# Patient Record
Sex: MALE | Race: BLACK OR AFRICAN AMERICAN | Employment: UNEMPLOYED | URBAN - METROPOLITAN AREA
[De-identification: names, ages, dates, MRNs, and addresses within clinical notes are randomized per-mention and may not be internally consistent; named-entity substitution may affect disease eponyms.]

---

## 2020-12-30 ENCOUNTER — OFFICE VISIT (OUTPATIENT)
Dept: INTERNAL MEDICINE CLINIC | Age: 50
End: 2020-12-30
Payer: MEDICAID

## 2020-12-30 VITALS
BODY MASS INDEX: 31.89 KG/M2 | SYSTOLIC BLOOD PRESSURE: 133 MMHG | HEIGHT: 67 IN | DIASTOLIC BLOOD PRESSURE: 82 MMHG | HEART RATE: 63 BPM | RESPIRATION RATE: 18 BRPM | TEMPERATURE: 98.5 F | WEIGHT: 203.2 LBS | OXYGEN SATURATION: 97 %

## 2020-12-30 DIAGNOSIS — E66.9 OBESITY (BMI 30.0-34.9): ICD-10-CM

## 2020-12-30 DIAGNOSIS — Z76.89 ENCOUNTER TO ESTABLISH CARE: Primary | ICD-10-CM

## 2020-12-30 DIAGNOSIS — Z12.11 SCREEN FOR COLON CANCER: ICD-10-CM

## 2020-12-30 PROCEDURE — 36415 COLL VENOUS BLD VENIPUNCTURE: CPT | Performed by: INTERNAL MEDICINE

## 2020-12-30 PROCEDURE — 99203 OFFICE O/P NEW LOW 30 MIN: CPT | Performed by: INTERNAL MEDICINE

## 2020-12-30 PROCEDURE — 93000 ELECTROCARDIOGRAM COMPLETE: CPT | Performed by: INTERNAL MEDICINE

## 2020-12-30 NOTE — PROGRESS NOTES
Chief Complaint   Patient presents with    New Patient     establish care, family history of hypertension           1. Have you been to the ER, urgent care clinic since your last visit? Hospitalized since your last visit? No    2. Have you seen or consulted any other health care providers outside of the [address] [place] [address] since your last visit? Include any pap smears or colon screening.  No

## 2020-12-30 NOTE — PROGRESS NOTES
[facility]  Lavelle Hernandez MD, [address] [address]  Phone:  [phone]  Fax: [phone]    Chief Complaint   Patient presents with    New Patient     establish care, family history of hypertension       SUBJECTIVE:    Melody Sullivan is a 45 y.o. male Patient returns today and is seen as a new patient for evaluation and ongoing care. He is concerned that he may have \"low T\". He is functioning appropriately. He feels sluggish sometimes. Patient is seen for evaluation. He is also wondering about having a heart scan offered by [employer] for $99.            Past Medical History:   Diagnosis Date    Encounter to establish care     Obesity (BMI 30.0-34. 9)      History reviewed. No pertinent surgical history.   No Known Allergies    REVIEW OF SYSTEMS:  General: negative for - chills or fever  ENT: negative for - headaches, nasal congestion or tinnitus  Respiratory: negative for - cough, hemoptysis, shortness of breath or wheezing  Cardiovascular : negative for - chest pain, edema, palpitations or shortness of breath  Gastrointestinal: negative for - abdominal pain, blood in stools, heartburn or nausea/vomiting  Genito-Urinary: no dysuria, trouble voiding, or hematuria  Musculoskeletal: negative for - gait disturbance, joint pain, joint stiffness or joint swelling  Neurological: no TIA or stroke symptoms  Hematologic: no bruises, no bleeding, no swollen glands  Integument: no lumps, mole changes, nail changes or rash  Endocrine:no malaise/lethargy or unexpected weight changes      Social History     Socioeconomic History    Marital status: SINGLE     Spouse name: Not on file    Number of children: Not on file    Years of education: Not on file    Highest education level: Not on file   Tobacco Use    Smoking status: Never Smoker    Smokeless tobacco: Never Used   Substance and Sexual Activity    Alcohol use: Not Currently    Drug use: Never    Sexual activity: Yes     Partners: Female     Birth control/protection: Condom     Family History   Problem Relation Age of Onset    Hypertension Mother     Diabetes Mother    Habits:  He is a lifetime non drug abuser, non cigarette abuser, occasional alcohol use. Social History:  The patient is single, lives with his girlfriend. He has a 27year old son, no grandchildren. He completed tenth grade, plus GED. He is gainfully employed as a [employer] , local primarily. No [employer] preference. Family History:  Father , perhaps related to [employer], he is not for sure. Mother 71 with diabetes and hypertension. One brother with a history of hypertension. OBJECTIVE:     Visit Vitals  /82   Pulse 63   Temp 98.5 °F (36.9 °C) (Oral)   Resp 18   Ht 5' 7\" (1.702 m)   Wt 203 lb 3.2 oz (92.2 kg)   SpO2 97%   BMI 31.83 kg/m²     CONSTITUTIONAL: well , well nourished, appears age appropriate  EYES: perrla, eom intact  ENMT:moist mucous membranes, pharynx clear  NECK: supple. Thyroid normal  RESPIRATORY: Chest: clear bilaterally  CARDIOVASCULAR: Heart: regular rate and rhythm  GASTROINTESTINAL: Abdomen: soft, bowel sounds active  HEMATOLOGIC: no pathological lymph nodes palpated  MUSCULOSKELETAL: Extremities: no edema, pulse 1+   INTEGUMENT: No unusual rashes or suspicious skin lesions noted. Nails appear normal.  NEUROLOGIC: non-focal exam   MENTAL STATUS: alert and oriented, appropriate affect     No visits with results within 3 Month(s) from this visit. Latest known visit with results is:   No results found for any previous visit. ASSESSMENT:   1. Encounter to establish care    2. Obesity (BMI 30.0-34. 9)      Patient is a healthy male. Blood pressure control is adequate. The only issue is related to his weight and he is just in to the obesity category. He wants to get down to a weight that would take him out of that category and we agree with that.   To that end, we encourage exercise for 30 minutes five days a week and a heart healthy, diabetic diet. Getting back to his weights would be helpful for him. He is concerned about testosterone level. We will do the level, I am not sure his insurance will pay for it, but we will make the request.  We also give copies of the wellness visit and we advise him also that the insurance may or may not pay for it. We suggest a colonoscopy, which he agrees. We suggest he come back and see us once a year. We give him our contact information, as well as MyChart, suggest he sign up for it and can send an email if he has a question. He can call us if he has a question. If he has a true emergency, we recommend [facility] emergency room. I have discussed the diagnosis with the patient and the intended plan as seen in the  orders above. The patient understands and agees with the plan. The patient has   received an after visit summary and questions were answered concerning  future plans  Patient labs and/or xrays were reviewed  Past records were reviewed. PLAN:  .  Orders Placed This Encounter    URINALYSIS W/ RFLX MICROSCOPIC    CBC WITH AUTOMATED DIFF    METABOLIC PANEL, COMPREHENSIVE    LIPID PANEL    PROSTATE SPECIFIC AG    HEMOGLOBIN A1C WITH EAG    TESTOSTERONE, FREE & TOTAL    AMB POC EKG ROUTINE W/ 12 LEADS, INTER & REP           ATTENTION:   This medical record was transcribed using an electronic medical records system. Although proofread, it may and can contain electronic and spelling errors. Other human spelling and other errors may be present. Corrections may be executed at a later time. Please feel free to contact us for any clarifications as needed.

## 2020-12-31 NOTE — PROGRESS NOTES
Please call the patient and advise  I think you should come by in about 3 months for nurse visit to repeat the renal panel and a gammopathy profile

## 2021-01-01 LAB
ALBUMIN SERPL-MCNC: 4.6 G/DL (ref 3.5–5)
ALBUMIN/GLOB SERPL: 1.2 {RATIO} (ref 1.1–2.2)
ALP SERPL-CCNC: 79 U/L (ref 45–117)
ALT SERPL-CCNC: 48 U/L (ref 12–78)
ANION GAP SERPL CALC-SCNC: 1 MMOL/L (ref 5–15)
APPEARANCE UR: CLEAR
AST SERPL-CCNC: 27 U/L (ref 15–37)
BASOPHILS # BLD: 0 K/UL (ref 0–0.1)
BASOPHILS NFR BLD: 1 % (ref 0–1)
BILIRUB SERPL-MCNC: 0.6 MG/DL (ref 0.2–1)
BILIRUB UR QL: NEGATIVE
BUN SERPL-MCNC: 22 MG/DL (ref 6–20)
BUN/CREAT SERPL: 16 (ref 12–20)
CALCIUM SERPL-MCNC: 9.5 MG/DL (ref 8.5–10.1)
CHLORIDE SERPL-SCNC: 106 MMOL/L (ref 97–108)
CHOLEST SERPL-MCNC: 215 MG/DL
CO2 SERPL-SCNC: 32 MMOL/L (ref 21–32)
COLOR UR: NORMAL
CREAT SERPL-MCNC: 1.35 MG/DL (ref 0.7–1.3)
DIFFERENTIAL METHOD BLD: NORMAL
EOSINOPHIL # BLD: 0.2 K/UL (ref 0–0.4)
EOSINOPHIL NFR BLD: 3 % (ref 0–7)
ERYTHROCYTE [DISTWIDTH] IN BLOOD BY AUTOMATED COUNT: 12.5 % (ref 11.5–14.5)
EST. AVERAGE GLUCOSE BLD GHB EST-MCNC: 111 MG/DL
GLOBULIN SER CALC-MCNC: 3.8 G/DL (ref 2–4)
GLUCOSE SERPL-MCNC: 74 MG/DL (ref 65–100)
GLUCOSE UR STRIP.AUTO-MCNC: NEGATIVE MG/DL
HBA1C MFR BLD: 5.5 % (ref 4–5.6)
HCT VFR BLD AUTO: 50.1 % (ref 36.6–50.3)
HDLC SERPL-MCNC: 39 MG/DL
HDLC SERPL: 5.5 {RATIO} (ref 0–5)
HGB BLD-MCNC: 16.9 G/DL (ref 12.1–17)
HGB UR QL STRIP: NEGATIVE
IMM GRANULOCYTES # BLD AUTO: 0 K/UL (ref 0–0.04)
IMM GRANULOCYTES NFR BLD AUTO: 0 % (ref 0–0.5)
KETONES UR QL STRIP.AUTO: NEGATIVE MG/DL
LDLC SERPL CALC-MCNC: 152.2 MG/DL (ref 0–100)
LEUKOCYTE ESTERASE UR QL STRIP.AUTO: NEGATIVE
LIPID PROFILE,FLP: ABNORMAL
LYMPHOCYTES # BLD: 1.9 K/UL (ref 0.8–3.5)
LYMPHOCYTES NFR BLD: 28 % (ref 12–49)
MCH RBC QN AUTO: 30.2 PG (ref 26–34)
MCHC RBC AUTO-ENTMCNC: 33.7 G/DL (ref 30–36.5)
MCV RBC AUTO: 89.5 FL (ref 80–99)
MONOCYTES # BLD: 0.6 K/UL (ref 0–1)
MONOCYTES NFR BLD: 8 % (ref 5–13)
NEUTS SEG # BLD: 4.2 K/UL (ref 1.8–8)
NEUTS SEG NFR BLD: 60 % (ref 32–75)
NITRITE UR QL STRIP.AUTO: NEGATIVE
NRBC # BLD: 0 K/UL (ref 0–0.01)
NRBC BLD-RTO: 0 PER 100 WBC
PH UR STRIP: 6.5 [PH] (ref 5–8)
PLATELET # BLD AUTO: 233 K/UL (ref 150–400)
PMV BLD AUTO: 10.3 FL (ref 8.9–12.9)
POTASSIUM SERPL-SCNC: 4.2 MMOL/L (ref 3.5–5.1)
PROT SERPL-MCNC: 8.4 G/DL (ref 6.4–8.2)
PROT UR STRIP-MCNC: NEGATIVE MG/DL
PSA FREE MFR SERPL: 33.6 %
PSA FREE SERPL-MCNC: 0.47 NG/ML
PSA SERPL-MCNC: 1.4 NG/ML (ref 0–4)
RBC # BLD AUTO: 5.6 M/UL (ref 4.1–5.7)
SODIUM SERPL-SCNC: 139 MMOL/L (ref 136–145)
SP GR UR REFRACTOMETRY: 1.02 (ref 1–1.03)
TESTOST FREE SERPL-MCNC: 14.3 PG/ML (ref 7.2–24)
TESTOST SERPL-MCNC: 554 NG/DL (ref 264–916)
TRIGL SERPL-MCNC: 119 MG/DL (ref ?–150)
UROBILINOGEN UR QL STRIP.AUTO: 0.2 EU/DL (ref 0.2–1)
VLDLC SERPL CALC-MCNC: 23.8 MG/DL
WBC # BLD AUTO: 7 K/UL (ref 4.1–11.1)

## 2021-02-22 RX ORDER — ACETAMINOPHEN 500 MG
4000 TABLET ORAL DAILY
COMMUNITY
End: 2022-04-07

## 2021-02-22 RX ORDER — ZINC GLUCONATE 10 MG
250 LOZENGE ORAL DAILY
COMMUNITY
End: 2022-04-07

## 2021-02-22 RX ORDER — FOLIC ACID 1 MG/1
1 TABLET ORAL DAILY
COMMUNITY
End: 2022-04-07

## 2021-02-22 RX ORDER — LANOLIN ALCOHOL/MO/W.PET/CERES
500 CREAM (GRAM) TOPICAL DAILY
COMMUNITY
End: 2022-04-07

## 2021-02-22 RX ORDER — LANOLIN ALCOHOL/MO/W.PET/CERES
100 CREAM (GRAM) TOPICAL DAILY
COMMUNITY
End: 2022-04-07

## 2021-02-25 ENCOUNTER — HOSPITAL ENCOUNTER (OUTPATIENT)
Dept: PREADMISSION TESTING | Age: 51
Discharge: HOME | End: 2021-02-25
Payer: MEDICAID

## 2021-02-25 ENCOUNTER — TRANSCRIBE ORDER (OUTPATIENT)
Dept: REGISTRATION | Age: 51
End: 2021-02-25

## 2021-02-25 DIAGNOSIS — Z01.812 PRE-PROCEDURE LAB EXAM: Primary | ICD-10-CM

## 2021-02-25 DIAGNOSIS — Z01.812 PRE-PROCEDURE LAB EXAM: ICD-10-CM

## 2021-02-25 PROCEDURE — U0005 INFEC AGEN DETEC AMPLI PROBE: HCPCS

## 2021-02-26 LAB — SARS-COV-2, COV2NT: NOT DETECTED

## 2021-03-01 ENCOUNTER — ANESTHESIA (OUTPATIENT)
Dept: ENDOSCOPY | Age: 51
End: 2021-03-01
Payer: MEDICAID

## 2021-03-01 ENCOUNTER — HOSPITAL ENCOUNTER (OUTPATIENT)
Age: 51
Setting detail: OUTPATIENT SURGERY
Discharge: HOME | End: 2021-03-01
Attending: INTERNAL MEDICINE | Admitting: INTERNAL MEDICINE
Payer: MEDICAID

## 2021-03-01 ENCOUNTER — ANESTHESIA EVENT (OUTPATIENT)
Dept: ENDOSCOPY | Age: 51
End: 2021-03-01
Payer: MEDICAID

## 2021-03-01 VITALS
HEART RATE: 56 BPM | WEIGHT: 205 LBS | DIASTOLIC BLOOD PRESSURE: 71 MMHG | SYSTOLIC BLOOD PRESSURE: 115 MMHG | RESPIRATION RATE: 15 BRPM | OXYGEN SATURATION: 92 % | BODY MASS INDEX: 32.18 KG/M2 | HEIGHT: 67 IN | TEMPERATURE: 97.8 F

## 2021-03-01 PROCEDURE — 74011000250 HC RX REV CODE- 250: Performed by: NURSE ANESTHETIST, CERTIFIED REGISTERED

## 2021-03-01 PROCEDURE — 76040000019: Performed by: INTERNAL MEDICINE

## 2021-03-01 PROCEDURE — 76060000031 HC ANESTHESIA FIRST 0.5 HR: Performed by: INTERNAL MEDICINE

## 2021-03-01 PROCEDURE — 74011250636 HC RX REV CODE- 250/636: Performed by: NURSE ANESTHETIST, CERTIFIED REGISTERED

## 2021-03-01 PROCEDURE — 74011250637 HC RX REV CODE- 250/637: Performed by: INTERNAL MEDICINE

## 2021-03-01 RX ORDER — LIDOCAINE HYDROCHLORIDE 20 MG/ML
INJECTION, SOLUTION EPIDURAL; INFILTRATION; INTRACAUDAL; PERINEURAL AS NEEDED
Status: DISCONTINUED | OUTPATIENT
Start: 2021-03-01 | End: 2021-03-01 | Stop reason: HOSPADM

## 2021-03-01 RX ORDER — SODIUM CHLORIDE 0.9 % (FLUSH) 0.9 %
5-40 SYRINGE (ML) INJECTION AS NEEDED
Status: DISCONTINUED | OUTPATIENT
Start: 2021-03-01 | End: 2021-03-01 | Stop reason: HOSPADM

## 2021-03-01 RX ORDER — SODIUM CHLORIDE 9 MG/ML
50 INJECTION, SOLUTION INTRAVENOUS CONTINUOUS
Status: DISCONTINUED | OUTPATIENT
Start: 2021-03-01 | End: 2021-03-01 | Stop reason: HOSPADM

## 2021-03-01 RX ORDER — MIDAZOLAM HYDROCHLORIDE 1 MG/ML
.25-1 INJECTION, SOLUTION INTRAMUSCULAR; INTRAVENOUS
Status: DISCONTINUED | OUTPATIENT
Start: 2021-03-01 | End: 2021-03-01 | Stop reason: HOSPADM

## 2021-03-01 RX ORDER — ATROPINE SULFATE 0.1 MG/ML
0.5 INJECTION INTRAVENOUS
Status: DISCONTINUED | OUTPATIENT
Start: 2021-03-01 | End: 2021-03-01 | Stop reason: HOSPADM

## 2021-03-01 RX ORDER — EPINEPHRINE 0.1 MG/ML
1 INJECTION INTRACARDIAC; INTRAVENOUS
Status: DISCONTINUED | OUTPATIENT
Start: 2021-03-01 | End: 2021-03-01 | Stop reason: HOSPADM

## 2021-03-01 RX ORDER — DEXTROMETHORPHAN/PSEUDOEPHED 2.5-7.5/.8
1.2 DROPS ORAL
Status: DISCONTINUED | OUTPATIENT
Start: 2021-03-01 | End: 2021-03-01 | Stop reason: HOSPADM

## 2021-03-01 RX ORDER — FLUMAZENIL 0.1 MG/ML
0.2 INJECTION INTRAVENOUS
Status: DISCONTINUED | OUTPATIENT
Start: 2021-03-01 | End: 2021-03-01 | Stop reason: HOSPADM

## 2021-03-01 RX ORDER — SODIUM CHLORIDE 0.9 % (FLUSH) 0.9 %
5-40 SYRINGE (ML) INJECTION EVERY 8 HOURS
Status: DISCONTINUED | OUTPATIENT
Start: 2021-03-01 | End: 2021-03-01 | Stop reason: HOSPADM

## 2021-03-01 RX ORDER — FENTANYL CITRATE 50 UG/ML
100 INJECTION, SOLUTION INTRAMUSCULAR; INTRAVENOUS
Status: DISCONTINUED | OUTPATIENT
Start: 2021-03-01 | End: 2021-03-01 | Stop reason: HOSPADM

## 2021-03-01 RX ORDER — NALOXONE HYDROCHLORIDE 0.4 MG/ML
0.4 INJECTION, SOLUTION INTRAMUSCULAR; INTRAVENOUS; SUBCUTANEOUS
Status: DISCONTINUED | OUTPATIENT
Start: 2021-03-01 | End: 2021-03-01 | Stop reason: HOSPADM

## 2021-03-01 RX ORDER — PROPOFOL 10 MG/ML
INJECTION, EMULSION INTRAVENOUS AS NEEDED
Status: DISCONTINUED | OUTPATIENT
Start: 2021-03-01 | End: 2021-03-01 | Stop reason: HOSPADM

## 2021-03-01 RX ORDER — SODIUM CHLORIDE 9 MG/ML
INJECTION, SOLUTION INTRAVENOUS
Status: DISCONTINUED | OUTPATIENT
Start: 2021-03-01 | End: 2021-03-01 | Stop reason: HOSPADM

## 2021-03-01 RX ADMIN — PROPOFOL 100 MG: 10 INJECTION, EMULSION INTRAVENOUS at 12:57

## 2021-03-01 RX ADMIN — PROPOFOL 100 MG: 10 INJECTION, EMULSION INTRAVENOUS at 13:02

## 2021-03-01 RX ADMIN — PROPOFOL 50 MG: 10 INJECTION, EMULSION INTRAVENOUS at 13:05

## 2021-03-01 RX ADMIN — SODIUM CHLORIDE: 900 INJECTION, SOLUTION INTRAVENOUS at 12:46

## 2021-03-01 RX ADMIN — LIDOCAINE HYDROCHLORIDE 100 MG: 20 INJECTION, SOLUTION EPIDURAL; INFILTRATION; INTRACAUDAL; PERINEURAL at 12:57

## 2021-03-01 NOTE — PROCEDURES
[address]  [address] [facility] [address], [address]  [phone]                              Colonoscopy Procedure Note      Indications:  Screening colonoscopy, average risk     :  Laurent Cuevas MD    Staff: Endoscopy Technician-1: Za Bynum  Endoscopy RN-1: Dequan Shannon RN    Referring Provider: Jewels Nassar MD    Sedation:  MAC anesthesia    Procedure Details:  After informed consent was obtained with all risks and benefits of procedure explained and preoperative exam completed, the patient was taken to the endoscopy suite and placed in the left lateral decubitus position. Upon sequential sedation as per above, a digital rectal exam was performed per below. The Olympus videocolonoscope was inserted in the rectum and carefully advanced to the cecum, which was identified by the ileocecal valve and appendiceal orifice. The quality of preparation was good. [place] Bowel Prep Score :3/3/3. The colonoscope was slowly withdrawn with careful evaluation between folds. Retroflexion in the rectum was performed. Findings:   Rectum: normal  Sigmoid: normal  Descending Colon: normal  Transverse Colon: normal  Ascending Colon: normal  Cecum: normal    Interventions:  none    Specimen Removed:  * No specimens in log *    Complications: None. EBL:  none    Impression:    See Postoperative diagnosis above    Recommendations:   - Resume normal medications.  - Recommend repeat colonoscopy in 10 years for screening purposes. Discharge Disposition:  Home in the company of a  when able to ambulate.     Laurent Cuevas MD  3/1/2021  1:14 PM

## 2021-03-01 NOTE — ROUTINE PROCESS
Josie Torres  1970  [id number]    Situation:  Verbal report received from: Shirley Pace RN  Procedure: Procedure(s):  COLONOSCOPY   :-    Background:    Preoperative diagnosis: SCREENING  Postoperative diagnosis: Normal exam    :  Dr. Bunny Pack  Assistant(s): Endoscopy Technician-1: Micah Gorman  Endoscopy RN-1: Sallie Enamorado RN    Specimens: * No specimens in log *  H. Pylori  no    Assessment:  Intra-procedure medications   Anesthesia gave intra-procedure sedation and medications, see anesthesia flow sheet yes    Intravenous fluids: NS@ KVO     Vital signs stable     Abdominal assessment: round and soft     Recommendation:  Discharge patient per MD order.   Family or Friend   Permission to share finding with family or friend no

## 2021-03-01 NOTE — H&P
[address]  [address], [address] [facility]                                History and Physical     NAME: Vennie Holter   :  1970   MRN:  [id number]     HPI:  The patient was seen and examined. Past Surgical History:   Procedure Laterality Date    HX ORTHOPAEDIC      surgery for torn ligament in finger     Past Medical History:   Diagnosis Date    Encounter to establish care     Obesity (BMI 30.0-34. 9)      Social History     Tobacco Use    Smoking status: Current Some Day Smoker    Smokeless tobacco: Never Used    Tobacco comment: hookah smoker but rare   Substance Use Topics    Alcohol use: Yes     Comment: social    Drug use: Never     No Known Allergies  Family History   Problem Relation Age of Onset    Hypertension Mother     Diabetes Mother      No current facility-administered medications for this encounter. PHYSICAL EXAM:  General: WD, WN. Alert, cooperative, no acute distress    HEENT: NC, Atraumatic. PERRLA, EOMI. Anicteric sclerae. Lungs:  CTA Bilaterally. No Wheezing/Rhonchi/Rales. Heart:  Regular  rhythm,  No murmur, No Rubs, No Gallops  Abdomen: Soft, Non distended, Non tender. +Bowel sounds, no HSM  Extremities: No c/c/e  Neurologic:  CN 2-12 gi, Alert and oriented X 3. No acute neurological distress   Psych:   Good insight. Not anxious nor agitated. The heart, lungs and mental status were satisfactory for the administration of MAC sedation and for the procedure. Mallampati score: 2     The patient was counseled at length about the risks of byron Covid-19 in the max-operative and post-operative states including the recovery window of their procedure. The patient was made aware that byron Covid-19 after a surgical procedure may worsen their prognosis for recovering from the virus and lend to a higher morbidity and or mortality risk. The patient was given the options of postponing their procedure. All of the risks, benefits, and alternatives were discussed. The patient does wish to proceed with the procedure.       Assessment:   · Screening colonoscopy    Plan:   · Endoscopic procedure :colonoscopy  · MAC sedation

## 2021-03-01 NOTE — DISCHARGE INSTRUCTIONS
[address]  [address], [address]  [address] [facility]  [id number]  1970    It was my pleasure seeing you for your procedure. You will also receive a summary report with the findings from this procedure and any further recommendations. If you had polyps removed or biopsies taken during your procedure, you will receive a separate letter from me within the next 2 weeks. If you don't receive this letter or if you have any questions, please call my office [phone]. Please take note of the post procedure instructions listed below. Best Wishes,    Dr. Ashley Schaefer    These instructions give you information on caring for yourself after your procedure. Call your doctor if you have any problems or questions after your procedure. HOME CARE  · Walk if you have belly cramping or gas. Walking will help get rid of the air and reduce the bloated feeling in your belly (abdomen). · Your IV site (where you received drugs) may be tender to touch. Place warm towels on the site; keep your arm up on two pillows if you have any swelling or soreness in the area. · You may shower. ACTIVITY:  · Take frequent rest periods and move at a slower pace for the next 24 hours. .  · You may resume your regular activity tomorrow if you are feeling back to normal.  · Do not drive or ride a bicycle for at least 24 hours (because of the medicine (anesthesia) used during the test). · Do not sign any important legal documents or use or operate any machinery for 24 hours  · Do not take sleeping medicines/nerve drugs for 24 hours unless the doctor tells you. · You can return to work/school tomorrow unless otherwise instructed. NUTRITION:  · Drink plenty of fluids to keep your pee (urine) clear or pale yellow  · Begin with a light meal and progress to your normal diet.  Heavy or fried foods are harder to digest and may make you feel sick to your stomach (nauseated). · Once you are feeling back to normal, you may resume your normal diet as instructed by your doctor. · Avoid alcoholic beverages for 24 hours or as instructed. IF YOU HAD BIOPSIES TAKEN OR POLYPS REMOVED DURING THE PROCEDURE:  · For the next 7 days, avoid all non-steroidal antiinflammatory medications such as Ibuprofen, Motrin, Advil, Alleve, Rupali-seltzer, Goody's powder, BC powder. · If you do not have an heart condition that requires you to take a daily aspirin, you should avoid taking aspirin for 7 days. · Eat a soft diet for 24 hours. · Monitor your stools for any blood or dark black, tar-like, stools as this may be a sign of bleeding and if you see any blood, notify your doctor immediately. GET HELP RIGHT AWAY AND SEEK IMMEDIATE MEDICAL CARE IF:  · You have more than a spotting of blood in your stool. · You pass clumps of tissue (blood clots) or fill the toilet with blood. · Your belly is painfully swollen or puffy (abdominal distention). · You throw up (vomit). · You have a fever. · You have redness, pain or swelling at the IV site that last greater than two days. · You have abdominal pain or discomfort that is severe or gets worse throughout the day. Post-procedure diagnosis:  Normal exam    Post-procedure recommendations:  Findings:   Rectum: normal  Sigmoid: normal  Descending Colon: normal  Transverse Colon: normal  Ascending Colon: normal  Cecum: normal    Recommendations:   - Resume normal medications.  - Recommend repeat colonoscopy in 10 years for screening purposes. Learning About Coronavirus [phone])  Coronavirus [phone]): Overview  What is coronavirus (COVID-19)? The coronavirus disease (COVID-19) is caused by a virus. It is an illness that was first found in [place], [place], in December 2019. It has since spread worldwide. The virus can cause fever, cough, and trouble breathing.  In severe cases, it can cause pneumonia and make it hard to breathe without help. It can cause death. Coronaviruses are a large group of viruses. They cause the common cold. They also cause more serious illnesses like Middle East respiratory syndrome (MERS) and severe acute respiratory syndrome (SARS). COVID-19 is caused by a novel coronavirus. That means it's a new type that has not been seen in people before. This virus spreads person-to-person through droplets from coughing and sneezing. It can also spread when you are close to someone who is infected. And it can spread when you touch something that has the virus on it, such as a doorknob or a tabletop. What can you do to protect yourself from coronavirus (COVID-19)? The best way to protect yourself from getting sick is to:  · Avoid areas where there is an outbreak. · Avoid contact with people who may be infected. · Wash your hands often with soap or alcohol-based hand sanitizers. · Avoid crowds and try to stay at least 6 feet away from other people. · Wash your hands often, especially after you cough or sneeze. Use soap and water, and scrub for at least 20 seconds. If soap and water aren't available, use an alcohol-based hand . · Avoid touching your mouth, nose, and eyes. What can you do to avoid spreading the virus to others? To help avoid spreading the virus to others:  · Cover your mouth with a tissue when you cough or sneeze. Then throw the tissue in the trash. · Use a disinfectant to clean things that you touch often. · Stay home if you are sick or have been exposed to the virus. Don't go to school, work, or public areas. And don't use public transportation. · If you are sick:  ? Leave your home only if you need to get medical care. But call the doctor's office first so they know you're coming. And wear a face mask, if you have one.  ? If you have a face mask, wear it whenever you're around other people.  It can help stop the spread of the virus when you cough or sneeze. ? Clean and disinfect your home every day. Use household  and disinfectant wipes or sprays. Take special care to clean things that you grab with your hands. These include doorknobs, remote controls, phones, and handles on your refrigerator and microwave. And don't forget countertops, tabletops, bathrooms, and computer keyboards. When to call for help  Call 911 anytime you think you may need emergency care. For example, call if:  · You have severe trouble breathing. (You can't talk at all.)  · You have constant chest pain or pressure. · You are severely dizzy or lightheaded. · You are confused or can't think clearly. · Your face and lips have a blue color. · You pass out (lose consciousness) or are very hard to wake up. Call your doctor now if you develop symptoms such as:  · Shortness of breath. · Fever. · Cough. If you need to get care, call ahead to the doctor's office for instructions before you go. Make sure you wear a face mask, if you have one, to prevent exposing other people to the virus. Where can you get the latest information? The following health organizations are tracking and studying this virus. Their websites contain the most up-to-date information. Boris Ao also learn what to do if you think you may have been exposed to the virus. · U.S. Centers for Disease Control and Prevention (CDC): The CDC provides updated news about the disease and travel advice. The website also tells you how to prevent the spread of infection. www.cdc.gov  · World Health Organization [facility]): WHO offers information about the virus outbreaks. WHO also has travel advice. www.who.int  Current as of: April 1, 2020               Content Version: 12.4  © 5198-0682 [employer], [employer]orporated.    Care instructions adapted under license by your healthcare professional. If you have questions about a medical condition or this instruction, always ask your healthcare professional. [address] any warranty or liability for your use of this information.

## 2021-03-01 NOTE — ANESTHESIA PREPROCEDURE EVALUATION
Relevant Problems   No relevant active problems       Anesthetic History   No history of anesthetic complications            Review of Systems / Medical History  Patient summary reviewed, nursing notes reviewed and pertinent labs reviewed    Pulmonary  Within defined limits                 Neuro/Psych   Within defined limits           Cardiovascular  Within defined limits                Exercise tolerance: >4 METS     GI/Hepatic/Renal  Within defined limits              Endo/Other  Within defined limits           Other Findings              Physical Exam    Airway  Mallampati: II  TM Distance: 4 - 6 cm  Neck ROM: normal range of motion   Mouth opening: Normal     Cardiovascular    Rhythm: regular  Rate: normal         Dental  No notable dental hx       Pulmonary  Breath sounds clear to auscultation               Abdominal  GI exam deferred       Other Findings            Anesthetic Plan    ASA: 2  Anesthesia type: MAC and total IV anesthesia          Induction: Intravenous  Anesthetic plan and risks discussed with: Patient

## 2021-03-01 NOTE — PERIOP NOTES
Kristyn Lema TRANSFER - IN REPORT:    Verbal report received from Prema(name) on [facility]  being received from for ordered procedure      Report consisted of patients Situation, Background, Assessment and   Recommendations(SBAR). Information from the following report(s) SBAR was reviewed with the receiving nurse. Opportunity for questions and clarification was provided. Assessment completed upon patients arrival to unit and care assumed. .Patient has been evaluated by anesthesia pre-procedure. Patient alert and oriented. Vital signs will not be charted by the Endoscopy nurse. All vitals, airway, and loc are monitored by anesthesia staff throughout procedure.        .Endoscope will be pre-cleaned at bedside immediately following procedure by Malik José

## 2021-03-10 NOTE — ANESTHESIA POSTPROCEDURE EVALUATION
Procedure(s):  COLONOSCOPY   :-.    MAC, total IV anesthesia    Anesthesia Post Evaluation        Patient location during evaluation: PACU  Note status: adequate.   Level of consciousness: awake  Pain management: satisfactory to patient  Airway patency: patent  Anesthetic complications: no  Cardiovascular status: acceptable  Respiratory status: acceptable  Hydration status: acceptable  Post anesthesia nausea and vomiting:  controlled      INITIAL Post-op Vital signs:   Vitals Value Taken Time   /71 03/01/21 1345   Temp 36.6 °C (97.8 °F) 03/01/21 1317   Pulse 56 03/01/21 1345   Resp 15 03/01/21 1345   SpO2 92 % 03/01/21 1345

## 2021-04-08 ENCOUNTER — OFFICE VISIT (OUTPATIENT)
Dept: INTERNAL MEDICINE CLINIC | Age: 51
End: 2021-04-08
Payer: MEDICAID

## 2021-04-08 VITALS
DIASTOLIC BLOOD PRESSURE: 73 MMHG | WEIGHT: 197.8 LBS | OXYGEN SATURATION: 96 % | BODY MASS INDEX: 31.04 KG/M2 | HEIGHT: 67 IN | HEART RATE: 60 BPM | RESPIRATION RATE: 16 BRPM | SYSTOLIC BLOOD PRESSURE: 118 MMHG | TEMPERATURE: 98.2 F

## 2021-04-08 DIAGNOSIS — K21.9 GASTROESOPHAGEAL REFLUX DISEASE WITHOUT ESOPHAGITIS: ICD-10-CM

## 2021-04-08 DIAGNOSIS — B35.1 ONYCHOMYCOSIS: ICD-10-CM

## 2021-04-08 DIAGNOSIS — E66.9 OBESITY (BMI 30.0-34.9): ICD-10-CM

## 2021-04-08 DIAGNOSIS — G89.29 CHRONIC PAIN OF LEFT KNEE: ICD-10-CM

## 2021-04-08 DIAGNOSIS — M25.562 CHRONIC PAIN OF LEFT KNEE: ICD-10-CM

## 2021-04-08 DIAGNOSIS — M25.561 CHRONIC PAIN OF RIGHT KNEE: Primary | ICD-10-CM

## 2021-04-08 DIAGNOSIS — G89.29 CHRONIC PAIN OF RIGHT KNEE: Primary | ICD-10-CM

## 2021-04-08 PROBLEM — Z98.890 S/P COLONOSCOPY: Status: ACTIVE | Noted: 2021-03-01

## 2021-04-08 PROCEDURE — 73564 X-RAY EXAM KNEE 4 OR MORE: CPT | Performed by: INTERNAL MEDICINE

## 2021-04-08 PROCEDURE — 99214 OFFICE O/P EST MOD 30 MIN: CPT | Performed by: INTERNAL MEDICINE

## 2021-04-08 RX ORDER — CLOTRIMAZOLE AND BETAMETHASONE DIPROPIONATE 10; .64 MG/G; MG/G
CREAM TOPICAL 2 TIMES DAILY
Qty: 15 G | Refills: 11 | Status: SHIPPED | OUTPATIENT
Start: 2021-04-08 | End: 2022-04-07

## 2021-04-08 RX ORDER — PANTOPRAZOLE SODIUM 40 MG/1
40 TABLET, DELAYED RELEASE ORAL DAILY
Qty: 30 TAB | Refills: 5 | Status: SHIPPED | OUTPATIENT
Start: 2021-04-08 | End: 2022-08-04

## 2021-04-08 NOTE — PROGRESS NOTES
SPORTS MEDICINE AND PRIMARY CARE  Markel Velarde MD, [address] [address]  Phone:  [phone]  Fax: [phone]       Chief Complaint   Patient presents with    GERD   . SUBJECTIVE:    Kimberley Estrella is a 45 y.o. male Patient returns today complaining of right foot and right knee pain, with a known history of GERD, obesity, and since we last saw him, he underwent colonoscopy on 03/01/21 by Param Mccullough with the findings of a normal colon exam and recommendation for repeat colonoscopy in ten years. Patient is concerned about creaking of his knees when he goes up and down the steps, particularly the right one, and some white tissue in the fifth webspace on the right, as well as the fungal infection of his toes, and is seen for evaluation. Current Outpatient Medications   Medication Sig Dispense Refill    clotrimazole-betamethasone (LOTRISONE) topical cream Apply  to affected area two (2) times a day. 15 g 11    pantoprazole (PROTONIX) 40 mg tablet Take 1 Tab by mouth daily. 30 Tab 5    MULTIVITAMIN PO Take  by mouth. Takes 2 gummies po once daily.  vitamin E acetate (VITAMIN E PO) Take  by mouth. Chews 800 units po once daily.  ZINC PO Take 50 mg by mouth daily.  folic acid (FOLVITE) 1 mg tablet Take 1 mg by mouth daily.  ascorbic acid (VITAMIN C PO) Take 1,500 mg by mouth daily.  thiamine HCL (Vitamin B-1) 100 mg tablet Take 100 mg by mouth daily.  cyanocobalamin (Vitamin B-12) 500 mcg tablet Take 500 mcg by mouth daily.  GARLIC PO Takes 9280 mg po 1-2 times a day.  cholecalciferol (Vitamin D3) (2,000 UNITS /50 MCG) cap capsule Take 4,000 Units by mouth daily.  magnesium 250 mg tab Take 250 mg by mouth daily. Past Medical History:   Diagnosis Date    Encounter to establish care     GERD (gastroesophageal reflux disease)     Knee pain, right     Obesity (BMI 30.0-34. 9)     Onychomycosis     S/P colonoscopy 03/01/2021    Roya Oliveros MD - repeat 10 yrs     Past Surgical History:   Procedure Laterality Date    COLONOSCOPY N/A 3/1/2021    COLONOSCOPY   :- performed by Roya Oliveros MD at [address] [place] [address]      surgery for torn ligament in finger     No Known Allergies      REVIEW OF SYSTEMS:  General: negative for - chills or fever  ENT: negative for - headaches, nasal congestion or tinnitus  Respiratory: negative for - cough, hemoptysis, shortness of breath or wheezing  Cardiovascular : negative for - chest pain, edema, palpitations or shortness of breath  Gastrointestinal: negative for - abdominal pain, blood in stools, heartburn or nausea/vomiting  Genito-Urinary: no dysuria, trouble voiding, or hematuria  Musculoskeletal: negative for - gait disturbance, joint pain, joint stiffness or joint swelling  Neurological: no TIA or stroke symptoms  Hematologic: no bruises, no bleeding, no swollen glands  Integument: no lumps, mole changes, nail changes or rash  Endocrine: no malaise/lethargy or unexpected weight changes      Social History     Socioeconomic History    Marital status: SINGLE     Spouse name: Not on file    Number of children: Not on file    Years of education: Not on file    Highest education level: Not on file   Tobacco Use    Smoking status: Never Smoker    Smokeless tobacco: Never Used    Tobacco comment: hookah smoker but rare   Substance and Sexual Activity    Alcohol use: Yes     Frequency: Monthly or less     Drinks per session: 1 or 2     Binge frequency: Never     Comment: social    Drug use: Never    Sexual activity: Yes     Partners: Female     Birth control/protection: Condom   Social History Narrative    Habits:  He is a lifetime non drug abuser, non cigarette abuser, occasional alcohol use.         Social History:  The patient is single, lives with his girlfriend. He has a 27year old son, no grandchildren. He completed tenth grade, plus GED.   He is gainfully employed as a [employer] , local primarily. No Shinto preference.         Family History:  Father , perhaps related to [employer], he is not for sure. Mother 71 with diabetes and hypertension. One brother with a history of hypertension     Family History   Problem Relation Age of Onset    Hypertension Mother     Diabetes Mother        OBJECTIVE:    Visit Vitals  /73   Pulse 60   Temp 98.2 °F (36.8 °C) (Oral)   Resp 16   Ht 5' 7\" (1.702 m)   Wt 197 lb 12.8 oz (89.7 kg)   SpO2 96%   BMI 30.98 kg/m²     CONSTITUTIONAL: well , well nourished, appears age appropriate  EYES: perrla, eom intact  ENMT:moist mucous membranes, pharynx clear  NECK: supple. Thyroid normal  RESPIRATORY: Chest: clear bilaterally   CARDIOVASCULAR: Heart: regular rate and rhythm  GASTROINTESTINAL: Abdomen: soft, bowel sounds active  HEMATOLOGIC: no pathological lymph nodes palpated  MUSCULOSKELETAL: Extremities: no edema, pulse 1+   INTEGUMENT: No unusual rashes or suspicious skin lesions noted. Nails appear normal.  NEUROLOGIC: non-focal exam   MENTAL STATUS: alert and oriented, appropriate affect           ASSESSMENT:  1. Chronic pain of right knee    2. Gastroesophageal reflux disease without esophagitis    3. Obesity (BMI 30.0-34.9)    4. Onychomycosis      He has crepitation with range of motion of the knee, intermittent knee discomfort. We will take an x-ray to see if there is any significant cartilage loss and suggest Naproxen. He is going to try chondroitin to see if that will help build up his cartilage. We advise him it works about 50% of the time for discomfort. He does a lot of steps, which may be contributing to the knee discomfort. Since he saw Dr. Alla Gonzales, he has developed reflux symptoms. We will try Protonix on a prn basis for the discomfort.   We advise him that Naproxen can adversely affect GERD and make it worse and suggest he not use Naproxen if GERD is flaring and use caution when taking Naproxen and take it after a meal.    We were concerned about obesity, he has lost weight. We congratulate him on his weight loss. He is getting out of the obesity category and into the overweight category. He has onychomycosis of his toes and he has web space tinea on the fifth webspace on the right. We will refer him to podiatry. He will be back to see me in a year, sooner if he has any problems. He rides motorcycles and we cautioned him about his activities. I have discussed the diagnosis with the patient and the intended plan as seen in the  Orders. The patient understands and agees with the plan. The patient has   received an after visit summary and questions were answered concerning  future plans  Patient labs and/or xrays were reviewed  Past records were reviewed. PLAN:  .  Orders Placed This Encounter    XR KNEE RT MIN 4 V    RENAL FUNCTION PANEL    GAMMOPATHY EVAL, SPEP/DAVID, IG QT/FLC    LIPID PANEL    MAGNESIUM    REFERRAL TO PODIATRY    clotrimazole-betamethasone (LOTRISONE) topical cream    pantoprazole (PROTONIX) 40 mg tablet       Follow-up and Dispositions    · Return in about 1 year (around 4/8/2022). ATTENTION:   This medical record was transcribed using an electronic medical records system. Although proofread, it may and can contain electronic and spelling errors. Other human spelling and other errors may be present. Corrections may be executed at a later time. Please feel free to contact us for any clarifications as needed.

## 2021-04-08 NOTE — PROGRESS NOTES
1. Have you been to the ER, urgent care clinic since your last visit? Hospitalized since your last visit? No    2. Have you seen or consulted any other health care providers outside of the [address] [place] [address] since your last visit? Include any pap smears or colon screening.  No     Wants to discuss GERD, right knee and foot pain

## 2021-04-09 LAB
ALBUMIN SERPL-MCNC: 4.6 G/DL (ref 3.5–5)
ANION GAP SERPL CALC-SCNC: 5 MMOL/L (ref 5–15)
BUN SERPL-MCNC: 12 MG/DL (ref 6–20)
BUN/CREAT SERPL: 10 (ref 12–20)
CALCIUM SERPL-MCNC: 9.2 MG/DL (ref 8.5–10.1)
CHLORIDE SERPL-SCNC: 105 MMOL/L (ref 97–108)
CHOLEST SERPL-MCNC: 166 MG/DL
CO2 SERPL-SCNC: 30 MMOL/L (ref 21–32)
CREAT SERPL-MCNC: 1.24 MG/DL (ref 0.7–1.3)
GLUCOSE SERPL-MCNC: 68 MG/DL (ref 65–100)
HDLC SERPL-MCNC: 38 MG/DL
HDLC SERPL: 4.4 {RATIO} (ref 0–5)
LDLC SERPL CALC-MCNC: 99.2 MG/DL (ref 0–100)
LIPID PROFILE,FLP: NORMAL
MAGNESIUM SERPL-MCNC: 2.3 MG/DL (ref 1.6–2.4)
PHOSPHATE SERPL-MCNC: 3.6 MG/DL (ref 2.6–4.7)
POTASSIUM SERPL-SCNC: 4.2 MMOL/L (ref 3.5–5.1)
SODIUM SERPL-SCNC: 140 MMOL/L (ref 136–145)
TRIGL SERPL-MCNC: 144 MG/DL (ref ?–150)
VLDLC SERPL CALC-MCNC: 28.8 MG/DL

## 2021-04-14 DIAGNOSIS — D47.2 MONOCLONAL GAMMOPATHY: Primary | ICD-10-CM

## 2021-04-14 LAB
ALBUMIN SERPL ELPH-MCNC: 4.3 G/DL (ref 2.9–4.4)
ALBUMIN/GLOB SERPL: 1.4 {RATIO} (ref 0.7–1.7)
ALPHA1 GLOB SERPL ELPH-MCNC: 0.2 G/DL (ref 0–0.4)
ALPHA2 GLOB SERPL ELPH-MCNC: 0.5 G/DL (ref 0.4–1)
B-GLOBULIN SERPL ELPH-MCNC: 1 G/DL (ref 0.7–1.3)
GAMMA GLOB SERPL ELPH-MCNC: 1.4 G/DL (ref 0.4–1.8)
GLOBULIN SER-MCNC: 3.1 G/DL (ref 2.2–3.9)
IGA SERPL-MCNC: 217 MG/DL (ref 90–386)
IGG SERPL-MCNC: 1552 MG/DL (ref 603–1613)
IGM SERPL-MCNC: 107 MG/DL (ref 20–172)
INTERPRETATION SERPL IEP-IMP: ABNORMAL
KAPPA LC FREE SER-MCNC: 22.5 MG/L (ref 3.3–19.4)
KAPPA LC FREE/LAMBDA FREE SER: 0.68 {RATIO} (ref 0.26–1.65)
LAMBDA LC FREE SERPL-MCNC: 33.1 MG/L (ref 5.7–26.3)
M PROTEIN SERPL ELPH-MCNC: ABNORMAL G/DL
PROT SERPL-MCNC: 7.4 G/DL (ref 6–8.5)

## 2021-06-04 ENCOUNTER — OFFICE VISIT (OUTPATIENT)
Dept: ONCOLOGY | Age: 51
End: 2021-06-04
Payer: MEDICAID

## 2021-06-04 VITALS
SYSTOLIC BLOOD PRESSURE: 127 MMHG | RESPIRATION RATE: 18 BRPM | HEART RATE: 57 BPM | WEIGHT: 199 LBS | OXYGEN SATURATION: 95 % | DIASTOLIC BLOOD PRESSURE: 82 MMHG | TEMPERATURE: 98.3 F | BODY MASS INDEX: 31.17 KG/M2

## 2021-06-04 DIAGNOSIS — D47.2 MGUS (MONOCLONAL GAMMOPATHY OF UNKNOWN SIGNIFICANCE): Primary | ICD-10-CM

## 2021-06-04 DIAGNOSIS — E66.9 OBESITY (BMI 30.0-34.9): ICD-10-CM

## 2021-06-04 PROCEDURE — 99203 OFFICE O/P NEW LOW 30 MIN: CPT | Performed by: INTERNAL MEDICINE

## 2021-06-04 RX ORDER — CEPHALEXIN 500 MG/1
CAPSULE ORAL
COMMUNITY
Start: 2021-05-24 | End: 2022-04-07

## 2021-06-04 NOTE — PROGRESS NOTES
[facility] at [address], [address], [address]: [phone]  F: [phone]    Reason for Visit:   Kimberley Estrella is a 45 y.o. male who is seen in consultation at the request of Dr. Buckley Najjar for evaluation of Abnormal SPEP    Treatment History:   · None yet     History of Present Illness:   Patient is a 45 y.o. male seen for above    He was noted to have an elevated Creatinine of 1.35 on 12/30/2021 and elevated total protein level of 8.4. A gammopathy evaluation was obtained and he was noted to have a + IgG monoclonal protein with lambda light chain specificity. Normal LCR and no  Measurable M spike. Sent for evaluation. He has no fevers, chill, slumps, no new pain, he has had some lipomas. He has no recent hospital admissions. Has no diarrhea. He has no FH of cancers      Past Medical History:   Diagnosis Date    Encounter to establish care     GERD (gastroesophageal reflux disease)     Knee pain, right     Monoclonal gammopathy 04/08/2021    Obesity (BMI 30.0-34. 9)     Onychomycosis     S/P colonoscopy 03/01/2021    Leah Guerrero MD - repeat 10 yrs      Past Surgical History:   Procedure Laterality Date    COLONOSCOPY N/A 3/1/2021    COLONOSCOPY   :- performed by Leah Guerrero MD at [address] [place] [address]      surgery for torn ligament in finger      Social History     Tobacco Use    Smoking status: Never Smoker    Smokeless tobacco: Never Used    Tobacco comment: hookah smoker but rare   Substance Use Topics    Alcohol use: Yes     Comment: social      Family History   Problem Relation Age of Onset    Hypertension Mother     Diabetes Mother      Current Outpatient Medications   Medication Sig    ZINC PO Take 50 mg by mouth daily.     cephALEXin (KEFLEX) 500 mg capsule TAKE 1 CAPSULE BY MOUTH TWICE DAILY WITH FOOD UNTIL ALL TAKEN    clotrimazole-betamethasone (LOTRISONE) topical cream Apply  to affected area two (2) times a day. (Patient not taking: Reported on 6/4/2021)    pantoprazole (PROTONIX) 40 mg tablet Take 1 Tab by mouth daily. (Patient not taking: Reported on 6/4/2021)    MULTIVITAMIN PO Take  by mouth. Takes 2 gummies po once daily. (Patient not taking: Reported on 6/4/2021)    vitamin E acetate (VITAMIN E PO) Take  by mouth. Chews 800 units po once daily. (Patient not taking: Reported on 8/3/1421)    folic acid (FOLVITE) 1 mg tablet Take 1 mg by mouth daily. (Patient not taking: Reported on 6/4/2021)    ascorbic acid (VITAMIN C PO) Take 1,500 mg by mouth daily. (Patient not taking: Reported on 6/4/2021)    thiamine HCL (Vitamin B-1) 100 mg tablet Take 100 mg by mouth daily. (Patient not taking: Reported on 6/4/2021)    cyanocobalamin (Vitamin B-12) 500 mcg tablet Take 500 mcg by mouth daily. (Patient not taking: Reported on 7/2/3290)    GARLIC PO Takes 3529 mg po 1-2 times a day. (Patient not taking: Reported on 6/4/2021)    cholecalciferol (Vitamin D3) (2,000 UNITS /50 MCG) cap capsule Take 4,000 Units by mouth daily. (Patient not taking: Reported on 6/4/2021)    magnesium 250 mg tab Take 250 mg by mouth daily. (Patient not taking: Reported on 6/4/2021)     No current facility-administered medications for this visit. No Known Allergies     Review of Systems: A complete review of systems was obtained, negative except as described above. Physical Exam:     Visit Vitals  /82 (BP 1 Location: Right arm, BP Patient Position: Sitting)   Pulse (!) 57   Temp 98.3 °F (36.8 °C)   Resp 18   Wt 199 lb (90.3 kg)   SpO2 95%   BMI 31.17 kg/m²     ECOG PS: 1  General: No distress  Eyes: PERRLA, anicteric sclerae  HENT: Atraumatic  Neck: Supple  Lymphatic: No cervical, supraclavicular, or inguinal adenopathy  Respiratory:  normal respiratory effort  CV: Normal rate,  no peripheral edema  GI: Soft, nontender  MS: Normal gait and station. Digits without clubbing or cyanosis.   Skin: No rashes, ecchymoses, or petechiae. Normal temperature, turgor, and texture. Psych: Alert, oriented, appropriate affect, normal judgment/insight    Results:     Lab Results   Component Value Date/Time    WBC 7.0 12/30/2020 02:10 PM    HGB 16.9 12/30/2020 02:10 PM    HCT 50.1 12/30/2020 02:10 PM    PLATELET 967 34/68/9986 02:10 PM    MCV 89.5 12/30/2020 02:10 PM    ABS. NEUTROPHILS 4.2 12/30/2020 02:10 PM     Lab Results   Component Value Date/Time    Sodium 140 04/08/2021 01:48 PM    Potassium 4.2 04/08/2021 01:48 PM    Chloride 105 04/08/2021 01:48 PM    CO2 30 04/08/2021 01:48 PM    Glucose 68 04/08/2021 01:48 PM    BUN 12 04/08/2021 01:48 PM    Creatinine 1.24 04/08/2021 01:48 PM    GFR est AA >60 04/08/2021 01:48 PM    GFR est non-AA >60 04/08/2021 01:48 PM    Calcium 9.2 04/08/2021 01:48 PM     Lab Results   Component Value Date/Time    Bilirubin, total 0.6 12/30/2020 02:10 PM    ALT (SGPT) 48 12/30/2020 02:10 PM    Alk. phosphatase 79 12/30/2020 02:10 PM    Protein, total 7.4 04/08/2021 01:48 PM    Albumin 4.6 04/08/2021 01:48 PM    Globulin 3.8 12/30/2020 02:10 PM       Lab Results   Component Value Date/Time    M-Jaden Not Observed 04/08/2021 01:48 PM     No results found for: INR, APTT, DDIMSQ, DDIME, 821805, [place], FDPLT, Ferdie Brothers, 316644, 354240, ATHRLT, PROSLT, PROSLF, PRSFLT, PRTCLT, PRCFLT, [id number], [id number], [id number], [id number], 393353, 087321, INREXT  Component      Latest Ref Rng & Units 4/8/2021           1:48 PM   Free Kappa Lt Chains, serum      3.3 - 19.4 mg/L 22.5 (H)   Free Lambda Lt Chains, serum      5.7 - 26.3 mg/L 33.1 (H)   Kappa/Lambda ratio, serum      0.26 - 1.65   0.68     Records reviewed and summarized above. Pathology report(s) reviewed above. Radiology report(s) reviewed above.       Assessment:   1) Low risk MGUS    Labs reviewed   Has a detectable IgG - lambda light chain specificity  No end organ damage    Discussed the spectrum of plasma cell disorders   Discussed that MGUS has a 10% risk for transforming to MM in 20 years   Observation alone is recommended    2) GERD    3) Obesity    4) HM  Uptodate      Plan:     · RTC 6 months with skeletal survey and cbc, cmp, gammopathy eval and upep  · Thereafter yearly follow up     I appreciate the opportunity to participate in Mr. Burgess Derrek Hamilton's care.     Signed By: Manjinder Sow MD

## 2021-06-04 NOTE — PROGRESS NOTES
Luly Snider is a 45 y.o. male    Chief Complaint   Patient presents with    New Patient     Monoclonal Gammopathy       1. Have you been to the ER, urgent care clinic since your last visit? Hospitalized since your last visit? No    2. Have you seen or consulted any other health care providers outside of the [address] [place] [address] since your last visit? Include any pap smears or colon screening.  No

## 2021-12-06 ENCOUNTER — DOCUMENTATION ONLY (OUTPATIENT)
Dept: ONCOLOGY | Age: 51
End: 2021-12-06

## 2021-12-06 NOTE — PROGRESS NOTES
HISTORY OF PRESENT ILLNESS:    Cheryle Olivares is a 45 y.o. female,   presents today for her routine visit and has no complaints.  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  NO GYN ISSUES    LAST VISIT 2016:   MAMMO LAST April, UP TO DATE.  PAP NOT INDICATED.  BREASTS MORE TENDER BUT HAS INCREASED HER INTAKE OF CAFFEINE LATELY - DISCUSSED.  REASSURED NO DOMINANT MASS PALPATED  LAST VISIT :  TVH BILATERAL SALPINGECTOMY 12/10/2015. There are no complaints, and both the procedure and the hospital course were uncomplicated.  NO FEVERS, SIGNIFICANT BLEEDING, AND ONLY CRAMPY PAIN. HAS HAD SOME INTERMITTENT PALPITATIONS, NO CP AND NO SOB. WILL VERIFY NL EKG TODAY AND REF FOR EVAL CARDIOLOGY NEXT AVAILABLE. HAD SIMILAR SX WHEN READMITTED PP LAST DELIVERY WITH PREECLAMPSIA BUT NO HTN  The pathology revealed:  UTERUS: CERVIX-NO SIGNIFICANT HISTOLOGICAL ABNORMALITY, NO EVIDENCE OF DYSPLASIA;  ENDOMETRIUM-SECRETORY PHASE;  MYOMETRIUM-LEIOMYOMA;  SEROSA-NO SIGNIFICANT HISTOLOGICAL ABNORMALITY.  FALLOPIAN TUBES (2): PARATUBAL CYST, NO OTHER SIGNIFICANT HISTOLOGICAL ABNORMALITY      Past Medical History:   Diagnosis Date    Allergy     Anemia     History of migraine headaches     Migraine headache     Morbid obesity     Preeclampsia, severe 7/15/2013       Past Surgical History:   Procedure Laterality Date    HYSTERECTOMY      TVH WITHOUT OOPHORECTOMY BLEEDING 2015 EK    left knee surgery         MEDICATIONS AND ALLERGIES:      Current Outpatient Prescriptions:     ALPRAZolam (XANAX) 0.25 MG tablet, Take 0.25 mg by mouth 3 (three) times daily as needed., Disp: , Rfl: 0    cetirizine (ZYRTEC) 10 MG tablet, Take 10 mg by mouth once daily.  , Disp: , Rfl:     fluticasone (FLONASE) 50 mcg/actuation nasal spray, 1 spray by Each Nare route once daily., Disp: , Rfl:     ibuprofen (ADVIL,MOTRIN) 800 MG tablet, Take 800 mg by mouth every 4 to 6 hours as needed., Disp: , Rfl: 0     No labs and has not had scans    Reschedule next available indomethacin (INDOCIN) 25 MG capsule, Take 1 capsule (25 mg total) by mouth every 6 (six) hours as needed., Disp: 60 capsule, Rfl: 0    magnesium citrate (CITRATE OF MAGNESIA) solution, Take 296 mLs by mouth once., Disp: , Rfl:     multivitamin capsule, Take 1 capsule by mouth once daily., Disp: , Rfl:     topiramate (TOPAMAX) 25 MG tablet, Take 25 mg by mouth once daily. , Disp: , Rfl: 5    vitamin E 400 UNIT capsule, Take 400 Units by mouth once daily., Disp: , Rfl:     Review of patient's allergies indicates:  No Known Allergies    Family History   Problem Relation Age of Onset    Skin cancer Father     Melanoma Father     Skin cancer Brother     Melanoma Brother     Colon cancer Maternal Uncle     Miscarriages / Stillbirths Sister     Retinal detachment Mother     Macular degeneration Mother     Hypertension Mother     Hypertension Maternal Grandmother     Macular degeneration Maternal Grandfather     Cataracts Maternal Grandfather     Heart attack Maternal Grandfather     Heart disease Maternal Grandfather     Coronary artery disease Paternal Grandfather     No Known Problems Maternal Aunt     No Known Problems Paternal Aunt     No Known Problems Paternal Uncle     No Known Problems Paternal Grandmother     Amblyopia Neg Hx     Blindness Neg Hx     Cancer Neg Hx     Diabetes Neg Hx     Glaucoma Neg Hx     Strabismus Neg Hx     Stroke Neg Hx     Thyroid disease Neg Hx     Breast cancer Neg Hx     Ovarian cancer Neg Hx        Social History     Social History    Marital status:      Spouse name: N/A    Number of children: N/A    Years of education: N/A     Occupational History    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 No    Breast-Feeding Yes     Social History Narrative    No narrative on file       COMPREHENSIVE GYN HISTORY:  PAP History: Denies abnormal Paps except as noted above.  Infection History: Denies STDs. Denies PID.  Benign History: Denies uterine fibroids. Denies ovarian cysts. Denies endometriosis. Denies other conditions.  Cancer History: Denies cervical cancer. Denies uterine cancer or hyperplasia. Denies ovarian cancer. Denies vulvar cancer or pre-cancer. Denies vaginal cancer or pre-cancer. Denies breast cancer. Denies colon cancer.  Menstrual History: Denies menses.     ROS:  GENERAL: No weight changes. No swelling. No fatigue. No fever.  CARDIOVASCULAR: No chest pain. No shortness of breath. No leg cramps.   NEUROLOGICAL: No headaches. No vision changes.  BREASTS: No pain. No lumps. No discharge.  ABDOMEN: No pain. No nausea. No vomiting. No diarrhea. No constipation.  REPRODUCTIVE: No abnormal bleeding.  VULVA: No pain. No lesions. No itching.  VAGINA: No relaxation. No itching. No odor. No discharge. No lesions.  URINARY: No incontinence. No nocturia. No frequency. No dysuria.    /80   Ht 5' 4" (1.626 m)   Wt 73.7 kg (162 lb 7.7 oz)   LMP 11/27/2015 (Exact Date)   BMI 27.89 kg/m²     PE:  APPEARANCE: Well nourished, well developed, in no acute distress.  AFFECT: WNL, alert and oriented x 3.  SKIN: No acne or hirsutism.  NECK: Neck symmetric without masses or thyromegaly.  NODES: No inguinal, cervical, axillary or femoral lymph node enlargement.  CHEST: Good respiratory effort.   ABDOMEN: Soft. No tenderness or masses. No hepatosplenomegaly. No hernias.  BREASTS: Symmetrical, no skin changes or visible lesions. No palpable masses, nipple discharge bilaterally.  PELVIC: ATROPHIC EXTERNAL FEMALE GENITALIA without lesions. Normal hair distribution. Adequate perineal body, normal urethral meatus. VAGINA DRY without lesions or discharge. No significant cystocele or " rectocele. Bimanual exam shows CERVIX and UTERUS to be SURGICALLY ABSENT. Adnexa without masses or tenderness.  EXTREMITIES: No edema.    DIAGNOSIS:  1. Visit for gynecologic examination     2. Breast cancer screening  Mammo Digital Screening Bilat with Tomosynthesis CAD       LABS AND TESTS ORDERED:  Mammogram    MEDICATIONS PRESCRIBED:    COUNSELING:  The patient was counseled today on osteoporosis prevention, calcium supplementation, and regular weight bearing exercise. The patient was also counseled today on ACS PAP guidelines, with recommendations for yearly pelvic exams unless their uterus, cervix, and ovaries were removed for benign reasons; in that case, examinations every 3-5 years are recommended.  The patient was also counseled regarding monthly breast self-examination, routine STD screening for at-risk populations, prophylactic immunizations for transmitted infections such as  HPV, Pertussis, or Influenza as appropriate, and yearly mammograms when indicated by ACS guidelines.  She was advised to see her primary care physician for all other health maintenance.  FOLLOW-UP with me for next routine visit.

## 2021-12-06 NOTE — PROGRESS NOTES
Called patient and instructed him to schedule him scan and do labs ( mailing labwork out) once the scan are schedule- then we can schedule appt.  Scheduling number given

## 2021-12-07 ENCOUNTER — TELEPHONE (OUTPATIENT)
Dept: ONCOLOGY | Age: 51
End: 2021-12-07

## 2021-12-07 NOTE — TELEPHONE ENCOUNTER
Called patient to R/S and to inform he needed labs and scans- patient wanted an explanation as to what this is for and why he needs these. Did not know his diagnoisis or understand why he was being seen. Gave patient as much info as I could but he seemed confused.  Please call to explain diagnosis and why he needs these done

## 2022-01-13 ENCOUNTER — HOSPITAL ENCOUNTER (EMERGENCY)
Age: 52
Discharge: HOME | End: 2022-01-13
Attending: EMERGENCY MEDICINE
Payer: MEDICAID

## 2022-01-13 VITALS
HEART RATE: 48 BPM | DIASTOLIC BLOOD PRESSURE: 61 MMHG | TEMPERATURE: 98 F | SYSTOLIC BLOOD PRESSURE: 101 MMHG | RESPIRATION RATE: 16 BRPM | OXYGEN SATURATION: 95 %

## 2022-01-13 DIAGNOSIS — R11.2 NON-INTRACTABLE VOMITING WITH NAUSEA, UNSPECIFIED VOMITING TYPE: Primary | ICD-10-CM

## 2022-01-13 DIAGNOSIS — R10.13 ABDOMINAL PAIN, EPIGASTRIC: ICD-10-CM

## 2022-01-13 LAB
ALBUMIN SERPL-MCNC: 3.8 G/DL (ref 3.5–5)
ALBUMIN/GLOB SERPL: 1.1 {RATIO} (ref 1.1–2.2)
ALP SERPL-CCNC: 66 U/L (ref 45–117)
ALT SERPL-CCNC: 53 U/L (ref 12–78)
ANION GAP SERPL CALC-SCNC: 9 MMOL/L (ref 5–15)
APPEARANCE UR: CLEAR
AST SERPL-CCNC: 17 U/L (ref 15–37)
ATRIAL RATE: 46 BPM
BACTERIA URNS QL MICRO: NEGATIVE /HPF
BASOPHILS # BLD: 0 K/UL (ref 0–0.1)
BASOPHILS NFR BLD: 0 % (ref 0–1)
BILIRUB SERPL-MCNC: 0.7 MG/DL (ref 0.2–1)
BILIRUB UR QL: NEGATIVE
BUN SERPL-MCNC: 23 MG/DL (ref 6–20)
BUN/CREAT SERPL: 18 (ref 12–20)
CALCIUM SERPL-MCNC: 8.8 MG/DL (ref 8.5–10.1)
CALCULATED P AXIS, ECG09: 57 DEGREES
CALCULATED R AXIS, ECG10: 59 DEGREES
CALCULATED T AXIS, ECG11: 51 DEGREES
CHLORIDE SERPL-SCNC: 101 MMOL/L (ref 97–108)
CO2 SERPL-SCNC: 30 MMOL/L (ref 21–32)
COLOR UR: ABNORMAL
CREAT SERPL-MCNC: 1.27 MG/DL (ref 0.7–1.3)
DIAGNOSIS, 93000: NORMAL
DIFFERENTIAL METHOD BLD: ABNORMAL
EOSINOPHIL # BLD: 0 K/UL (ref 0–0.4)
EOSINOPHIL NFR BLD: 0 % (ref 0–7)
EPITH CASTS URNS QL MICRO: ABNORMAL /LPF
ERYTHROCYTE [DISTWIDTH] IN BLOOD BY AUTOMATED COUNT: 12.2 % (ref 11.5–14.5)
GLOBULIN SER CALC-MCNC: 3.6 G/DL (ref 2–4)
GLUCOSE SERPL-MCNC: 90 MG/DL (ref 65–100)
GLUCOSE UR STRIP.AUTO-MCNC: NEGATIVE MG/DL
HCT VFR BLD AUTO: 48.5 % (ref 36.6–50.3)
HGB BLD-MCNC: 16.6 G/DL (ref 12.1–17)
HGB UR QL STRIP: ABNORMAL
IMM GRANULOCYTES # BLD AUTO: 0 K/UL (ref 0–0.04)
IMM GRANULOCYTES NFR BLD AUTO: 0 % (ref 0–0.5)
KETONES UR QL STRIP.AUTO: NEGATIVE MG/DL
LEUKOCYTE ESTERASE UR QL STRIP.AUTO: NEGATIVE
LIPASE SERPL-CCNC: 79 U/L (ref 73–393)
LYMPHOCYTES # BLD: 1.3 K/UL (ref 0.8–3.5)
LYMPHOCYTES NFR BLD: 9 % (ref 12–49)
MCH RBC QN AUTO: 29.9 PG (ref 26–34)
MCHC RBC AUTO-ENTMCNC: 34.2 G/DL (ref 30–36.5)
MCV RBC AUTO: 87.4 FL (ref 80–99)
MONOCYTES # BLD: 0.8 K/UL (ref 0–1)
MONOCYTES NFR BLD: 6 % (ref 5–13)
NEUTS SEG # BLD: 11.9 K/UL (ref 1.8–8)
NEUTS SEG NFR BLD: 85 % (ref 32–75)
NITRITE UR QL STRIP.AUTO: NEGATIVE
NRBC # BLD: 0 K/UL (ref 0–0.01)
NRBC BLD-RTO: 0 PER 100 WBC
P-R INTERVAL, ECG05: 118 MS
PH UR STRIP: 7 [PH] (ref 5–8)
PLATELET # BLD AUTO: 234 K/UL (ref 150–400)
PMV BLD AUTO: 9.3 FL (ref 8.9–12.9)
POTASSIUM SERPL-SCNC: 3.4 MMOL/L (ref 3.5–5.1)
PROT SERPL-MCNC: 7.4 G/DL (ref 6.4–8.2)
PROT UR STRIP-MCNC: NEGATIVE MG/DL
Q-T INTERVAL, ECG07: 464 MS
QRS DURATION, ECG06: 94 MS
QTC CALCULATION (BEZET), ECG08: 406 MS
RBC # BLD AUTO: 5.55 M/UL (ref 4.1–5.7)
RBC #/AREA URNS HPF: ABNORMAL /HPF (ref 0–5)
SODIUM SERPL-SCNC: 140 MMOL/L (ref 136–145)
SP GR UR REFRACTOMETRY: 1.01 (ref 1–1.03)
TROPONIN-HIGH SENSITIVITY: 12 NG/L (ref 0–76)
TROPONIN-HIGH SENSITIVITY: 13 NG/L (ref 0–76)
UR CULT HOLD, URHOLD: NORMAL
UROBILINOGEN UR QL STRIP.AUTO: 0.2 EU/DL (ref 0.2–1)
VENTRICULAR RATE, ECG03: 46 BPM
WBC # BLD AUTO: 14.1 K/UL (ref 4.1–11.1)
WBC URNS QL MICRO: ABNORMAL /HPF (ref 0–4)

## 2022-01-13 PROCEDURE — 84484 ASSAY OF TROPONIN QUANT: CPT

## 2022-01-13 PROCEDURE — 74011250636 HC RX REV CODE- 250/636: Performed by: PHYSICIAN ASSISTANT

## 2022-01-13 PROCEDURE — 85025 COMPLETE CBC W/AUTO DIFF WBC: CPT

## 2022-01-13 PROCEDURE — 93005 ELECTROCARDIOGRAM TRACING: CPT

## 2022-01-13 PROCEDURE — 96375 TX/PRO/DX INJ NEW DRUG ADDON: CPT

## 2022-01-13 PROCEDURE — 99284 EMERGENCY DEPT VISIT MOD MDM: CPT

## 2022-01-13 PROCEDURE — 96374 THER/PROPH/DIAG INJ IV PUSH: CPT

## 2022-01-13 PROCEDURE — 80053 COMPREHEN METABOLIC PANEL: CPT

## 2022-01-13 PROCEDURE — 74011000250 HC RX REV CODE- 250: Performed by: PHYSICIAN ASSISTANT

## 2022-01-13 PROCEDURE — 81001 URINALYSIS AUTO W/SCOPE: CPT

## 2022-01-13 PROCEDURE — 96361 HYDRATE IV INFUSION ADD-ON: CPT

## 2022-01-13 PROCEDURE — 83690 ASSAY OF LIPASE: CPT

## 2022-01-13 PROCEDURE — C9113 INJ PANTOPRAZOLE SODIUM, VIA: HCPCS | Performed by: PHYSICIAN ASSISTANT

## 2022-01-13 RX ORDER — DICYCLOMINE HYDROCHLORIDE 10 MG/5ML
20 SOLUTION ORAL 4 TIMES DAILY
Qty: 120 ML | Refills: 0 | Status: SHIPPED | OUTPATIENT
Start: 2022-01-13 | End: 2022-01-16

## 2022-01-13 RX ORDER — DIPHENHYDRAMINE HYDROCHLORIDE 50 MG/ML
25 INJECTION, SOLUTION INTRAMUSCULAR; INTRAVENOUS
Status: COMPLETED | OUTPATIENT
Start: 2022-01-13 | End: 2022-01-13

## 2022-01-13 RX ORDER — METOCLOPRAMIDE 10 MG/1
10 TABLET ORAL
Qty: 20 TABLET | Refills: 0 | Status: SHIPPED | OUTPATIENT
Start: 2022-01-13 | End: 2022-01-23

## 2022-01-13 RX ORDER — FAMOTIDINE 20 MG/1
20 TABLET, FILM COATED ORAL
COMMUNITY
Start: 2022-01-11 | End: 2022-05-22 | Stop reason: SDUPTHER

## 2022-01-13 RX ORDER — ONDANSETRON 4 MG/1
TABLET, ORALLY DISINTEGRATING ORAL
COMMUNITY
Start: 2022-01-11 | End: 2022-04-07

## 2022-01-13 RX ORDER — KETOROLAC TROMETHAMINE 30 MG/ML
15 INJECTION, SOLUTION INTRAMUSCULAR; INTRAVENOUS
Status: COMPLETED | OUTPATIENT
Start: 2022-01-13 | End: 2022-01-13

## 2022-01-13 RX ADMIN — SODIUM CHLORIDE 1000 ML: 9 INJECTION, SOLUTION INTRAVENOUS at 13:56

## 2022-01-13 RX ADMIN — PANTOPRAZOLE SODIUM 40 MG: 40 INJECTION, POWDER, FOR SOLUTION INTRAVENOUS at 13:59

## 2022-01-13 RX ADMIN — DIPHENHYDRAMINE HYDROCHLORIDE 25 MG: 50 INJECTION INTRAMUSCULAR; INTRAVENOUS at 14:03

## 2022-01-13 RX ADMIN — PROCHLORPERAZINE EDISYLATE 10 MG: 5 INJECTION, SOLUTION INTRAMUSCULAR; INTRAVENOUS at 14:06

## 2022-01-13 RX ADMIN — KETOROLAC TROMETHAMINE 15 MG: 30 INJECTION, SOLUTION INTRAMUSCULAR; INTRAVENOUS at 14:01

## 2022-01-13 NOTE — ED NOTES
Pt reports he was evaluated at [facility] for the same symptoms on Monday and they did not find anything wrong.

## 2022-01-13 NOTE — LETTER
[facility]  [address]  [address]  [phone]    Work/School Note    Date: 1/13/2022    To Whom It May concern:    Miryam Tolberts was seen and treated today in the emergency room by the following provider(s):  Physician Assistant: Kilo Mcneill. Miryam Crimes may return to work on 1/15/22.     Sincerely,        Judith Pryors RN

## 2022-01-13 NOTE — ED TRIAGE NOTES
Pt reports he has been vomiting since Monday and he thinks it is food poisoning. Pt reports he thinks the food he ate Monday was not cooked fully.

## 2022-01-13 NOTE — ED PROVIDER NOTES
Charito Head is a 45 y.o. male who presents to ED with cc of nausea with intermittent vomiting since Monday. Patient states he ate arepa which he does not feel was fully cooked and noted onset of symptoms afterwards. He endorses some epigastric abdominal discomfort. States he was seen at [facility] ER on 1/11/2021 with images of paperwork below. Reports they obtained some blood work and did a CT abdomen secondary to elevated white blood count at 14.57. States work-up was without acute findings and he was placed on course of Pepcid and Zofran. States his symptoms have somewhat continued since that time. Denies significant diarrhea or constipation. Notes LBM recently. Pt denies additional symptoms of F/C, cough, congestion, CP, SOB, dysuria, urinary frequency. PMHx: Reviewed, as below. PSHx: Reviewed, as below. PCP: Clau Shepard MD    There are no other complaints verbalized at this time. Past Medical History:   Diagnosis Date    Encounter to establish care     GERD (gastroesophageal reflux disease)     Knee pain, right     Monoclonal gammopathy 04/08/2021    Obesity (BMI 30.0-34. 9)     Onychomycosis     S/P colonoscopy 03/01/2021    Stella Quintana MD - repeat 10 yrs       Past Surgical History:   Procedure Laterality Date    COLONOSCOPY N/A 3/1/2021    COLONOSCOPY   :- performed by Stella Quintana MD at [address] [place] [address]      surgery for torn ligament in finger         Family History:   Problem Relation Age of Onset    Hypertension Mother     Diabetes Mother        Social History     Socioeconomic History    Marital status: SINGLE     Spouse name: Not on file    Number of children: Not on file    Years of education: Not on file    Highest education level: Not on file   Occupational History    Not on file   Tobacco Use    Smoking status: Never Smoker    Smokeless tobacco: Never Used    Tobacco comment: hookah smoker but rare   Vaping Use    Vaping Use: Never used   Substance and Sexual Activity    Alcohol use: Yes     Comment: social    Drug use: Never    Sexual activity: Yes     Partners: Female     Birth control/protection: Condom   Other Topics Concern    Not on file   Social History Narrative    Habits:  He is a lifetime non drug abuser, non cigarette abuser, occasional alcohol use.         Social History:  The patient is single, lives with his girlfriend. He has a 27year old son, no grandchildren. He completed tenth grade, plus GED. He is gainfully employed as a [employer] , local primarily. No Muslim preference.         Family History:  Father , perhaps related to [employer], he is not for sure. Mother 71 with diabetes and hypertension. One brother with a history of hypertension     Social Determinants of Health     Financial Resource Strain:     Difficulty of Paying Living Expenses: Not on file   Food Insecurity:     Worried About Running Out of Food in the Last Year: Not on file    Debbie of Food in the Last Year: Not on file   Transportation Needs:     Lack of Transportation (Medical): Not on file    Lack of Transportation (Non-Medical):  Not on file   Physical Activity:     Days of Exercise per Week: Not on file    Minutes of Exercise per Session: Not on file   Stress:     Feeling of Stress : Not on file   Social Connections:     Frequency of Communication with Friends and Family: Not on file    Frequency of Social Gatherings with Friends and Family: Not on file    Attends Rastafari Services: Not on file    Active Member of Clubs or Organizations: Not on file    Attends Club or Organization Meetings: Not on file    Marital Status: Not on file   Intimate Partner Violence:     Fear of Current or Ex-Partner: Not on file    Emotionally Abused: Not on file    Physically Abused: Not on file    Sexually Abused: Not on file   Housing Stability:     Unable to Pay for Housing in the Last Year: Not on file    Number of Places Lived in the Last Year: Not on file    Unstable Housing in the Last Year: Not on file         ALLERGIES: Patient has no known allergies. Review of Systems   Constitutional: Negative for chills and fever. HENT: Negative for congestion. Respiratory: Negative for cough and shortness of breath. Cardiovascular: Negative for chest pain. Gastrointestinal: Positive for abdominal pain, nausea and vomiting. Negative for constipation and diarrhea. Genitourinary: Negative for dysuria and frequency. All other systems reviewed and are negative. Vitals:    01/13/22 1254 01/13/22 1404 01/13/22 1534   BP: 123/77 123/76 101/61   Pulse: (!) 48 (!) 50 (!) 48   Resp: 16 16    Temp: 98 °F (36.7 °C)     SpO2: 100% 94% 95%            Physical Exam  Vitals and nursing note reviewed. Constitutional:       Appearance: He is not diaphoretic. HENT:      Head: Normocephalic. Right Ear: External ear normal.      Left Ear: External ear normal.   Eyes:      General: No scleral icterus. Cardiovascular:      Rate and Rhythm: Normal rate and regular rhythm. Heart sounds: Normal heart sounds. No murmur heard. Pulmonary:      Effort: Pulmonary effort is normal. No respiratory distress. Breath sounds: Normal breath sounds. No stridor. No wheezing, rhonchi or rales. Abdominal:      General: Bowel sounds are normal. There is no distension. Palpations: Abdomen is soft. There is no mass. Tenderness: There is abdominal tenderness (mild) in the epigastric area. There is no guarding or rebound. Hernia: No hernia is present. Musculoskeletal:         General: No swelling. Cervical back: Normal range of motion. Skin:     General: Skin is warm and dry. Neurological:      Mental Status: He is alert and oriented to person, place, and time. Mental status is at baseline.           MDM  Number of Diagnoses or Management Options     Amount and/or Complexity of Data Reviewed  Clinical lab tests: ordered and reviewed  Discuss the patient with other providers: yes (Dr Allison Cadena, ED attending)           Procedures        EKG obtained: 14:06  My interpretation:  Rate: 46 bpm, bradycardia. Rhythm: regular. Axis: normal. Intervals: QT/QTc 464/406 ms. No STEMI.        3:53 PM  Pt reevaluated. Pt states he feels much improved. I have reviewed with them results as obtained thus far and opportunity for questions presented. Pt verbalizes their understanding. VITAL SIGNS:  Vitals:    01/13/22 1254 01/13/22 1404 01/13/22 1534   BP: 123/77 123/76 101/61   Pulse: (!) 48 (!) 50 (!) 48   Resp: 16 16    Temp: 98 °F (36.7 °C)     SpO2: 100% 94% 95%         LABS:  Recent Results (from the past 24 hour(s))   TROPONIN-HIGH SENSITIVITY    Collection Time: 01/13/22  2:48 PM   Result Value Ref Range    Troponin-High Sensitivity 12 0 - 76 ng/L         IMAGING:  No orders to display         Medications During Visit:  Medications   sodium chloride 0.9 % bolus infusion 1,000 mL (0 mL IntraVENous IV Completed 1/13/22 1510)   ketorolac (TORADOL) injection 15 mg (15 mg IntraVENous Given 1/13/22 1401)   pantoprazole (PROTONIX) 40 mg in 0.9% sodium chloride 10 mL injection (40 mg IntraVENous Given 1/13/22 1359)   prochlorperazine (COMPAZINE) with saline injection 10 mg (10 mg IntraVENous Given 1/13/22 1406)   diphenhydrAMINE (BENADRYL) injection 25 mg (25 mg IntraVENous Given 1/13/22 1403)         DECISION MAKING:    Lavern Devries is a 45 y.o. male who comes in as above. Presents afebrile and hemodynamically stable. Presents with intermittent nausea and vomiting ongoing since Monday after he reports to eating arepa which he does not feel was fully cooked. Had negative work-up 2 days prior with imaging at [facility]. Presents today out of concern that symptoms have somewhat continued; not worse but have not completely resolved.   Work-up here included CBC, CMP, lipase, troponin, delta troponin, EKG and UA. Notable for slightly elevated WBC at 14.1 however this is noted to be similar to previous white count and without any increase since 2 days prior. Patient notes resolution of symptoms in ED after fluids, Toradol, Protonix and antiemetic. Given lab work and symptom improvement, will hold on repeat CT at this time however I have discussed close return precautions with patient. Will treat with continued Pepcid, as needed Reglan and Bentyl and follow-up with his PCP/GI. .I have discussed care with ED attending. Pt and I have discussed close return precautions as well as follow up recommendations. Opportunity for questions presented. Pt verbalizes their understanding and agreement with care plan. IMPRESSION:  1. Non-intractable vomiting with nausea, unspecified vomiting type    2. Abdominal pain, epigastric        DISPOSITION:  Discharged      Discharge Medication List as of 1/13/2022  3:56 PM      START taking these medications    Details   metoclopramide HCl (Reglan) 10 mg tablet Take 1 Tablet by mouth every six (6) hours as needed for Nausea for up to 10 days. , Normal, Disp-20 Tablet, R-0      dicyclomine (BENTYL) 10 mg/5 mL soln oral solution Take 10 mL by mouth four (4) times daily for 3 days. , Normal, Disp-120 mL, R-0         CONTINUE these medications which have NOT CHANGED    Details   ondansetron (ZOFRAN ODT) 4 mg disintegrating tablet DISSOLVE 1 TABLET ON THE TONGUE TWICE DAILY FOR VOMITING, Historical Med      famotidine (PEPCID) 20 mg tablet Take 20 mg by mouth nightly.  , Historical Med      pantoprazole (PROTONIX) 40 mg tablet Take 1 Tab by mouth daily. , Normal, Disp-30 Tab, R-5      cephALEXin (KEFLEX) 500 mg capsule TAKE 1 CAPSULE BY MOUTH TWICE DAILY WITH FOOD UNTIL ALL TAKEN, Historical Med      clotrimazole-betamethasone (LOTRISONE) topical cream Apply  to affected area two (2) times a day., Normal, Disp-15 g, R-11      MULTIVITAMIN PO Take  by mouth.  Takes 2 gummies po once daily. , Historical Med      vitamin E acetate (VITAMIN E PO) Take  by mouth. Chews 800 units po once daily. , Historical Med      ZINC PO Take 50 mg by mouth daily. , Historical Med      folic acid (FOLVITE) 1 mg tablet Take 1 mg by mouth daily. , Historical Med      ascorbic acid (VITAMIN C PO) Take 1,500 mg by mouth daily. , Historical Med      thiamine HCL (Vitamin B-1) 100 mg tablet Take 100 mg by mouth daily. , Historical Med      cyanocobalamin (Vitamin B-12) 500 mcg tablet Take 500 mcg by mouth daily. , Historical Med      GARLIC PO Takes 9025 mg po 1-2 times a day., Historical Med      cholecalciferol (Vitamin D3) (2,000 UNITS /50 MCG) cap capsule Take 4,000 Units by mouth daily. , Historical Med      magnesium 250 mg tab Take 250 mg by mouth daily. , Historical Med              Follow-up Information     Follow up With Specialties Details Why Contact Info    Charlene Cornelius MD Internal Medicine Call   [address] [address]  [address] [facility]  [phone]      [facility] [address] [facility] Emergency Medicine Go to  As needed, If symptoms worsen [address] [id number]  [address] [facility]  Schedule an appointment as soon as possible for a visit  As needed [address] [facility] [address] [facility]            The patient is asked to follow-up with their primary care provider and any other physicians as above. We have discussed strict return precautions and the patient is asked to return promptly for any increased concerns or worsening of symptoms and for return precautions regarding their symptoms today. They can return to this emergency department or any other emergency department. I have discussed with them results as above and presented opportunity for questions. They verbalize their understanding of the above and agreement with care plan.     Please note that this dictation was completed with [employer], the [employer] voice recognition software. Quite often unanticipated grammatical, syntax, homophones, and other interpretive errors are inadvertently transcribed by the computer software. Please disregard these errors. Please excuse any errors that have escaped final proofreading.

## 2022-03-19 PROBLEM — Z98.890 S/P COLONOSCOPY: Status: ACTIVE | Noted: 2021-03-01

## 2022-03-20 PROBLEM — D47.2 MGUS (MONOCLONAL GAMMOPATHY OF UNKNOWN SIGNIFICANCE): Status: ACTIVE | Noted: 2021-04-08

## 2022-04-07 ENCOUNTER — OFFICE VISIT (OUTPATIENT)
Dept: INTERNAL MEDICINE CLINIC | Age: 52
End: 2022-04-07
Payer: MEDICAID

## 2022-04-07 VITALS
RESPIRATION RATE: 16 BRPM | WEIGHT: 181.6 LBS | DIASTOLIC BLOOD PRESSURE: 78 MMHG | BODY MASS INDEX: 28.5 KG/M2 | HEIGHT: 67 IN | SYSTOLIC BLOOD PRESSURE: 122 MMHG | OXYGEN SATURATION: 94 % | HEART RATE: 64 BPM | TEMPERATURE: 98.7 F

## 2022-04-07 DIAGNOSIS — G44.52 NEW DAILY PERSISTENT HEADACHE: ICD-10-CM

## 2022-04-07 DIAGNOSIS — R20.0 ARM NUMBNESS LEFT: ICD-10-CM

## 2022-04-07 DIAGNOSIS — K21.9 GASTROESOPHAGEAL REFLUX DISEASE WITHOUT ESOPHAGITIS: ICD-10-CM

## 2022-04-07 DIAGNOSIS — R07.89 OTHER CHEST PAIN: Primary | ICD-10-CM

## 2022-04-07 DIAGNOSIS — E66.9 OBESITY (BMI 30.0-34.9): ICD-10-CM

## 2022-04-07 PROCEDURE — 93000 ELECTROCARDIOGRAM COMPLETE: CPT | Performed by: INTERNAL MEDICINE

## 2022-04-07 PROCEDURE — 99214 OFFICE O/P EST MOD 30 MIN: CPT | Performed by: INTERNAL MEDICINE

## 2022-04-07 NOTE — PROGRESS NOTES
SPORTS MEDICINE AND PRIMARY CARE  Annie Fowler MD, [address] [address]  Phone:  [phone]  Fax: [phone]       Chief Complaint   Patient presents with    Personal Problem   . SUBJECTIVE:    Suyapa Arnett is a 47 y.o. 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 Observation alone was recommended. Denita Barthel, [place] saw him for episodes of intermittent vomiting and he had an unremarkable evaluation. He was discharged with Metoclopramide and Dicyclomine. He comes in today for follow up. He complains of numbness and tingling in the right arm, beginning in his hand in a median nerve distribution going up into his forearm. It goes up and shoots into his shoulder. No discomfort in his neck. Week before last he noted two sharp pains in his head of such magnitude that he just could not move. It lasted for about five or ten minutes and then subsided. Since the episode he has had some headaches in the parietal area. When the pain comes, it can last for hours until he takes something. He does not have a history of headaches. Sometimes when he is sleeping now, he wakes up and it feels like he cannot catch his breath. He has problems with acid reflux in his stomach and is seen for evaluation. Current Outpatient Medications   Medication Sig Dispense Refill    famotidine (PEPCID) 20 mg tablet Take 20 mg by mouth nightly.  pantoprazole (PROTONIX) 40 mg tablet Take 1 Tab by mouth daily. 30 Tab 5     Past Medical History:   Diagnosis Date    Encounter to establish care     GERD (gastroesophageal reflux disease)     Knee pain, right     Monoclonal gammopathy 04/08/2021    Obesity (BMI 30.0-34. 9)     Onychomycosis     S/P colonoscopy 03/01/2021    Neal Barros Luis Enrique Mckenna MD - repeat 10 yrs     Past Surgical History:   Procedure Laterality Date    COLONOSCOPY N/A 3/1/2021    COLONOSCOPY   :- performed by Khris Branch MD at [facility]      surgery for torn ligament in finger     No Known Allergies      REVIEW OF SYSTEMS:  General: negative for - chills or fever  ENT: negative for - headaches, nasal congestion or tinnitus  Respiratory: negative for - cough, hemoptysis, shortness of breath or wheezing  Cardiovascular : negative for - chest pain, edema, palpitations or shortness of breath  Gastrointestinal: negative for - abdominal pain, blood in stools, heartburn or nausea/vomiting  Genito-Urinary: no dysuria, trouble voiding, or hematuria  Musculoskeletal: negative for - gait disturbance, joint pain, joint stiffness or joint swelling  Neurological: no TIA or stroke symptoms  Hematologic: no bruises, no bleeding, no swollen glands  Integument: no lumps, mole changes, nail changes or rash  Endocrine: no malaise/lethargy or unexpected weight changes      Social History     Socioeconomic History    Marital status: SINGLE   Tobacco Use    Smoking status: Never Smoker    Smokeless tobacco: Never Used    Tobacco comment: hookah smoker but rare   Vaping Use    Vaping Use: Never used   Substance and Sexual Activity    Alcohol use: Yes     Comment: social    Drug use: Never    Sexual activity: Yes     Partners: Female     Birth control/protection: Condom   Social History Narrative    Habits:  He is a lifetime non drug abuser, non cigarette abuser, occasional alcohol use.         Social History:  The patient is single, lives with his girlfriend. He has a 27year old son, no grandchildren. He completed tenth grade, plus GED. He is gainfully employed as a [employer] , local primarily. No Lutheran preference.         Family History:  Father , perhaps related to [employer], he is not for sure.   Mother 71 with diabetes and hypertension. One brother with a history of hypertension     Family History   Problem Relation Age of Onset    Hypertension Mother     Diabetes Mother        OBJECTIVE:    Visit Vitals  /78   Pulse 64   Temp 98.7 °F (37.1 °C) (Oral)   Resp 16   Ht 5' 7\" (1.702 m)   Wt 181 lb 9.6 oz (82.4 kg)   SpO2 94%   BMI 28.44 kg/m²     CONSTITUTIONAL: well , well nourished, appears age appropriate  EYES: perrla, eom intact  ENMT:moist mucous membranes, pharynx clear  NECK: supple. Thyroid normal  RESPIRATORY: Chest: clear bilaterally   CARDIOVASCULAR: Heart: regular rate and rhythm  GASTROINTESTINAL: Abdomen: soft, bowel sounds active  HEMATOLOGIC: no pathological lymph nodes palpated  MUSCULOSKELETAL: Extremities: no edema, pulse 1+   INTEGUMENT: No unusual rashes or suspicious skin lesions noted. Nails appear normal.  NEUROLOGIC: non-focal exam   MENTAL STATUS: alert and oriented, appropriate affect           ASSESSMENT:  1. Other chest pain    2. Gastroesophageal reflux disease without esophagitis    3. Obesity (BMI 30.0-34.9)    4. New daily persistent headache    5. Arm numbness left      Patient has numbness in his left arm and it goes up to his shoulder, but not his neck. Whether it is cervical radiculopathy or impingement syndrome, I am not for sure and we will therefore request EMG and nerve conduction studies. He complains of some vague left precordial chest pain and we will ask for an EKG today, as well as a stress test.    He continues to have GERD and is using Pantoprazole. He has new onset headache that started rather suddenly and abruptly and now it continues to linger. We will ask for an MRI of the head. He will be back to see us in three months, sooner if he has any problems. I have discussed the diagnosis with the patient and the intended plan as seen in the  Orders. The patient understands and agees with the plan.   The patient has   received an after visit summary and questions were answered concerning  future plans  Patient labs and/or xrays were reviewed  Past records were reviewed. PLAN:  .  Orders Placed This Encounter    MRI BRAIN W WO CONT    RENAL FUNCTION PANEL    PROSTATE SPECIFIC AG    LIPID PANEL    HEPATITIS C AB    AMB POC EKG ROUTINE W/ 12 LEADS, INTER & REP       Follow-up and Dispositions    · Return in about 3 months (around 7/7/2022). ATTENTION:   This medical record was transcribed using an electronic medical records system. Although proofread, it may and can contain electronic and spelling errors. Other human spelling and other errors may be present. Corrections may be executed at a later time. Please feel free to contact us for any clarifications as needed.

## 2022-04-07 NOTE — PROGRESS NOTES
Chief Complaint   Patient presents with    Personal Problem         1. Have you been to the ER, urgent care clinic since your last visit? Hospitalized since your last visit? Yes When: 1/13/22 Where: [facility] Reason for visit: vomiting    2. Have you seen or consulted any other health care providers outside of the [address] [place] [address] since your last visit? Include any pap smears or colon screening.  No

## 2022-04-09 LAB
ALBUMIN SERPL-MCNC: 4.6 G/DL (ref 3.8–4.9)
BUN SERPL-MCNC: 14 MG/DL (ref 6–24)
BUN/CREAT SERPL: 11 (ref 9–20)
CALCIUM SERPL-MCNC: 9.1 MG/DL (ref 8.7–10.2)
CHLORIDE SERPL-SCNC: 100 MMOL/L (ref 96–106)
CHOLEST SERPL-MCNC: 171 MG/DL (ref 100–199)
CO2 SERPL-SCNC: 23 MMOL/L (ref 20–29)
CREAT SERPL-MCNC: 1.24 MG/DL (ref 0.76–1.27)
EGFR: 70 ML/MIN/1.73
GLUCOSE SERPL-MCNC: 60 MG/DL (ref 65–99)
HCV AB S/CO SERPL IA: <0.1 S/CO RATIO (ref 0–0.9)
HDLC SERPL-MCNC: 39 MG/DL
LDLC SERPL CALC-MCNC: 115 MG/DL (ref 0–99)
PHOSPHATE SERPL-MCNC: 3.3 MG/DL (ref 2.8–4.1)
POTASSIUM SERPL-SCNC: 4.1 MMOL/L (ref 3.5–5.2)
PSA SERPL-MCNC: 1.8 NG/ML (ref 0–4)
SODIUM SERPL-SCNC: 144 MMOL/L (ref 134–144)
TRIGL SERPL-MCNC: 94 MG/DL (ref 0–149)
VLDLC SERPL CALC-MCNC: 17 MG/DL (ref 5–40)

## 2022-04-11 PROBLEM — R07.89 OTHER CHEST PAIN: Status: ACTIVE | Noted: 2022-04-07

## 2022-05-22 ENCOUNTER — APPOINTMENT (OUTPATIENT)
Dept: CT IMAGING | Age: 52
End: 2022-05-22
Attending: STUDENT IN AN ORGANIZED HEALTH CARE EDUCATION/TRAINING PROGRAM
Payer: MEDICAID

## 2022-05-22 ENCOUNTER — HOSPITAL ENCOUNTER (EMERGENCY)
Age: 52
Discharge: HOME | End: 2022-05-22
Attending: EMERGENCY MEDICINE
Payer: MEDICAID

## 2022-05-22 VITALS
HEART RATE: 54 BPM | HEIGHT: 67 IN | DIASTOLIC BLOOD PRESSURE: 80 MMHG | WEIGHT: 175 LBS | RESPIRATION RATE: 18 BRPM | TEMPERATURE: 97.5 F | OXYGEN SATURATION: 95 % | BODY MASS INDEX: 27.47 KG/M2 | SYSTOLIC BLOOD PRESSURE: 142 MMHG

## 2022-05-22 DIAGNOSIS — K21.9 GASTROESOPHAGEAL REFLUX DISEASE, UNSPECIFIED WHETHER ESOPHAGITIS PRESENT: ICD-10-CM

## 2022-05-22 DIAGNOSIS — K52.9 GASTROENTERITIS, ACUTE: Primary | ICD-10-CM

## 2022-05-22 LAB
ALBUMIN SERPL-MCNC: 3.8 G/DL (ref 3.5–5)
ALBUMIN/GLOB SERPL: 1.1 {RATIO} (ref 1.1–2.2)
ALP SERPL-CCNC: 61 U/L (ref 45–117)
ALT SERPL-CCNC: 37 U/L (ref 12–78)
ANION GAP SERPL CALC-SCNC: 6 MMOL/L (ref 5–15)
APPEARANCE UR: CLEAR
AST SERPL-CCNC: 21 U/L (ref 15–37)
BACTERIA URNS QL MICRO: NEGATIVE /HPF
BASOPHILS # BLD: 0 K/UL (ref 0–0.1)
BASOPHILS NFR BLD: 0 % (ref 0–1)
BILIRUB SERPL-MCNC: 0.5 MG/DL (ref 0.2–1)
BILIRUB UR QL: NEGATIVE
BUN SERPL-MCNC: 18 MG/DL (ref 6–20)
BUN/CREAT SERPL: 17 (ref 12–20)
CALCIUM SERPL-MCNC: 8.7 MG/DL (ref 8.5–10.1)
CHLORIDE SERPL-SCNC: 104 MMOL/L (ref 97–108)
CO2 SERPL-SCNC: 24 MMOL/L (ref 21–32)
COLOR UR: ABNORMAL
COMMENT, HOLDF: NORMAL
CREAT SERPL-MCNC: 1.09 MG/DL (ref 0.7–1.3)
DIFFERENTIAL METHOD BLD: ABNORMAL
EOSINOPHIL # BLD: 0 K/UL (ref 0–0.4)
EOSINOPHIL NFR BLD: 0 % (ref 0–7)
EPITH CASTS URNS QL MICRO: ABNORMAL /LPF
ERYTHROCYTE [DISTWIDTH] IN BLOOD BY AUTOMATED COUNT: 12.5 % (ref 11.5–14.5)
GLOBULIN SER CALC-MCNC: 3.6 G/DL (ref 2–4)
GLUCOSE SERPL-MCNC: 94 MG/DL (ref 65–100)
GLUCOSE UR STRIP.AUTO-MCNC: NEGATIVE MG/DL
HCT VFR BLD AUTO: 44.3 % (ref 36.6–50.3)
HGB BLD-MCNC: 15.2 G/DL (ref 12.1–17)
HGB UR QL STRIP: ABNORMAL
HYALINE CASTS URNS QL MICRO: ABNORMAL /LPF (ref 0–5)
IMM GRANULOCYTES # BLD AUTO: 0.1 K/UL (ref 0–0.04)
IMM GRANULOCYTES NFR BLD AUTO: 0 % (ref 0–0.5)
KETONES UR QL STRIP.AUTO: 15 MG/DL
LEUKOCYTE ESTERASE UR QL STRIP.AUTO: NEGATIVE
LIPASE SERPL-CCNC: 38 U/L (ref 73–393)
LYMPHOCYTES # BLD: 1 K/UL (ref 0.8–3.5)
LYMPHOCYTES NFR BLD: 8 % (ref 12–49)
MCH RBC QN AUTO: 30.8 PG (ref 26–34)
MCHC RBC AUTO-ENTMCNC: 34.3 G/DL (ref 30–36.5)
MCV RBC AUTO: 89.9 FL (ref 80–99)
MONOCYTES # BLD: 0.7 K/UL (ref 0–1)
MONOCYTES NFR BLD: 5 % (ref 5–13)
NEUTS SEG # BLD: 10.7 K/UL (ref 1.8–8)
NEUTS SEG NFR BLD: 87 % (ref 32–75)
NITRITE UR QL STRIP.AUTO: NEGATIVE
NRBC # BLD: 0 K/UL (ref 0–0.01)
NRBC BLD-RTO: 0 PER 100 WBC
PH UR STRIP: 6.5 [PH] (ref 5–8)
PLATELET # BLD AUTO: 169 K/UL (ref 150–400)
PMV BLD AUTO: 9.7 FL (ref 8.9–12.9)
POTASSIUM SERPL-SCNC: 3.3 MMOL/L (ref 3.5–5.1)
PROT SERPL-MCNC: 7.4 G/DL (ref 6.4–8.2)
PROT UR STRIP-MCNC: 30 MG/DL
RBC # BLD AUTO: 4.93 M/UL (ref 4.1–5.7)
RBC #/AREA URNS HPF: ABNORMAL /HPF (ref 0–5)
SAMPLES BEING HELD,HOLD: NORMAL
SODIUM SERPL-SCNC: 134 MMOL/L (ref 136–145)
SP GR UR REFRACTOMETRY: 1.03 (ref 1–1.03)
UR CULT HOLD, URHOLD: NORMAL
UROBILINOGEN UR QL STRIP.AUTO: 0.2 EU/DL (ref 0.2–1)
WBC # BLD AUTO: 12.4 K/UL (ref 4.1–11.1)
WBC URNS QL MICRO: ABNORMAL /HPF (ref 0–4)

## 2022-05-22 PROCEDURE — 81001 URINALYSIS AUTO W/SCOPE: CPT

## 2022-05-22 PROCEDURE — 74177 CT ABD & PELVIS W/CONTRAST: CPT

## 2022-05-22 PROCEDURE — 80053 COMPREHEN METABOLIC PANEL: CPT

## 2022-05-22 PROCEDURE — 85025 COMPLETE CBC W/AUTO DIFF WBC: CPT

## 2022-05-22 PROCEDURE — 74011250636 HC RX REV CODE- 250/636: Performed by: STUDENT IN AN ORGANIZED HEALTH CARE EDUCATION/TRAINING PROGRAM

## 2022-05-22 PROCEDURE — 74011000250 HC RX REV CODE- 250: Performed by: STUDENT IN AN ORGANIZED HEALTH CARE EDUCATION/TRAINING PROGRAM

## 2022-05-22 PROCEDURE — 74011250637 HC RX REV CODE- 250/637: Performed by: STUDENT IN AN ORGANIZED HEALTH CARE EDUCATION/TRAINING PROGRAM

## 2022-05-22 PROCEDURE — 99285 EMERGENCY DEPT VISIT HI MDM: CPT

## 2022-05-22 PROCEDURE — 74011000636 HC RX REV CODE- 636: Performed by: RADIOLOGY

## 2022-05-22 PROCEDURE — 96361 HYDRATE IV INFUSION ADD-ON: CPT

## 2022-05-22 PROCEDURE — 96374 THER/PROPH/DIAG INJ IV PUSH: CPT

## 2022-05-22 PROCEDURE — 36415 COLL VENOUS BLD VENIPUNCTURE: CPT

## 2022-05-22 PROCEDURE — 96375 TX/PRO/DX INJ NEW DRUG ADDON: CPT

## 2022-05-22 PROCEDURE — 83690 ASSAY OF LIPASE: CPT

## 2022-05-22 RX ORDER — ONDANSETRON 2 MG/ML
4 INJECTION INTRAMUSCULAR; INTRAVENOUS ONCE
Status: COMPLETED | OUTPATIENT
Start: 2022-05-22 | End: 2022-05-22

## 2022-05-22 RX ORDER — MORPHINE SULFATE 2 MG/ML
2 INJECTION, SOLUTION INTRAMUSCULAR; INTRAVENOUS ONCE
Status: COMPLETED | OUTPATIENT
Start: 2022-05-22 | End: 2022-05-22

## 2022-05-22 RX ORDER — ONDANSETRON 4 MG/1
4 TABLET, ORALLY DISINTEGRATING ORAL
Qty: 20 TABLET | Refills: 0 | Status: SHIPPED | OUTPATIENT
Start: 2022-05-22 | End: 2022-08-04

## 2022-05-22 RX ORDER — FAMOTIDINE 20 MG/1
20 TABLET, FILM COATED ORAL
Qty: 30 TABLET | Refills: 0 | Status: SHIPPED | OUTPATIENT
Start: 2022-05-22 | End: 2022-08-04 | Stop reason: SDUPTHER

## 2022-05-22 RX ADMIN — ONDANSETRON HYDROCHLORIDE 4 MG: 2 SOLUTION INTRAMUSCULAR; INTRAVENOUS at 10:36

## 2022-05-22 RX ADMIN — ALUMINUM HYDROXIDE, MAGNESIUM HYDROXIDE, AND SIMETHICONE 40 ML: 200; 200; 20 SUSPENSION ORAL at 12:05

## 2022-05-22 RX ADMIN — MORPHINE SULFATE 2 MG: 2 INJECTION, SOLUTION INTRAMUSCULAR; INTRAVENOUS at 10:36

## 2022-05-22 RX ADMIN — SODIUM CHLORIDE 1000 ML: 9 INJECTION, SOLUTION INTRAVENOUS at 10:36

## 2022-05-22 RX ADMIN — IOPAMIDOL 100 ML: 755 INJECTION, SOLUTION INTRAVENOUS at 12:59

## 2022-05-22 NOTE — ED PROVIDER NOTES
47 y.o. male with history of GERD, monoclonal gammopathy, obesity and onychomycosis presents to ED with 1 day of nausea, vomiting, and abdominal pain. He notes symptoms have persisted since yesterday when he first noticed it. He describes the abdominal pain as crampy in nature and located in epigastric and periumbilical area. He tried taking Zofran, famotidine and omeprazole with some relief but pain still there. He feels like he has not been able to keep anything down and has not eaten much since yesterday. He has not had a bowel movement since yesterday morning. He denies any sick contacts but does note that he ate peanuts from a vending machine and seems like the symptoms started since then. Denies any fevers, chills, urinary symptoms. No bowel movement since yesterday morning. The history is provided by the patient. Abdominal Pain  Associated symptoms include abdominal pain. Pertinent negatives include no chest pain and no headaches. Past Medical History:   Diagnosis Date    Encounter to establish care     GERD (gastroesophageal reflux disease)     Knee pain, right     Monoclonal gammopathy 04/08/2021    Obesity (BMI 30.0-34. 9)     Onychomycosis     S/P colonoscopy 03/01/2021    Berline Meigs, MD - repeat 10 yrs       Past Surgical History:   Procedure Laterality Date    COLONOSCOPY N/A 3/1/2021    COLONOSCOPY   :- performed by Berline Meigs, MD at [address] [place] [address]      surgery for torn ligament in finger         Family History:   Problem Relation Age of Onset    Hypertension Mother     Diabetes Mother        Social History     Socioeconomic History    Marital status: SINGLE     Spouse name: Not on file    Number of children: Not on file    Years of education: Not on file    Highest education level: Not on file   Occupational History    Not on file   Tobacco Use    Smoking status: Never Smoker    Smokeless tobacco: Never Used    Tobacco comment: arabella smoker but rare   Vaping Use    Vaping Use: Never used   Substance and Sexual Activity    Alcohol use: Yes     Comment: social    Drug use: Never    Sexual activity: Yes     Partners: Female     Birth control/protection: Condom   Other Topics Concern    Not on file   Social History Narrative    Habits:  He is a lifetime non drug abuser, non cigarette abuser, occasional alcohol use.         Social History:  The patient is single, lives with his girlfriend. He has a 27year old son, no grandchildren. He completed tenth grade, plus GED. He is gainfully employed as a [employer] , local primarily. No Worship preference.         Family History:  Father , perhaps related to [employer], he is not for sure. Mother 71 with diabetes and hypertension. One brother with a history of hypertension     Social Determinants of Health     Financial Resource Strain:     Difficulty of Paying Living Expenses: Not on file   Food Insecurity:     Worried About Running Out of Food in the Last Year: Not on file    Debbie of Food in the Last Year: Not on file   Transportation Needs:     Lack of Transportation (Medical): Not on file    Lack of Transportation (Non-Medical):  Not on file   Physical Activity:     Days of Exercise per Week: Not on file    Minutes of Exercise per Session: Not on file   Stress:     Feeling of Stress : Not on file   Social Connections:     Frequency of Communication with Friends and Family: Not on file    Frequency of Social Gatherings with Friends and Family: Not on file    Attends Yazidism Services: Not on file    Active Member of Clubs or Organizations: Not on file    Attends Club or Organization Meetings: Not on file    Marital Status: Not on file   Intimate Partner Violence:     Fear of Current or Ex-Partner: Not on file    Emotionally Abused: Not on file    Physically Abused: Not on file    Sexually Abused: Not on file   Housing Stability:     Unable to Pay for Housing in the Last Year: Not on file    Number of Places Lived in the Last Year: Not on file    Unstable Housing in the Last Year: Not on file         ALLERGIES: Patient has no known allergies. Review of Systems   Constitutional: Negative for fever. HENT: Negative for congestion and sinus pain. Respiratory: Negative for cough. Cardiovascular: Negative for chest pain. Gastrointestinal: Positive for abdominal pain, nausea and vomiting. Genitourinary: Negative for dysuria. Musculoskeletal: Negative for myalgias. Neurological: Negative for headaches. Hematological: Negative for adenopathy. All other systems reviewed and are negative. Vitals:    05/22/22 0853   BP: (!) 142/80   Pulse: (!) 54   Resp: 18   Temp: 97.5 °F (36.4 °C)   SpO2: 95%   Weight: 79.4 kg (175 lb)   Height: 5' 7\" (1.702 m)            Physical Exam  Vitals and nursing note reviewed. Constitutional:       Appearance: Normal appearance. Eyes:      Extraocular Movements: Extraocular movements intact. Pupils: Pupils are equal, round, and reactive to light. Cardiovascular:      Rate and Rhythm: Normal rate and regular rhythm. Heart sounds: Normal heart sounds. Pulmonary:      Effort: Pulmonary effort is normal.      Breath sounds: Normal breath sounds. Abdominal:      General: Bowel sounds are normal.      Palpations: Abdomen is soft. Tenderness: There is abdominal tenderness in the periumbilical area. There is no guarding or rebound. Lymphadenopathy:      Cervical: No cervical adenopathy. Skin:     General: Skin is warm and dry. Neurological:      General: No focal deficit present. Mental Status: He is alert and oriented to person, place, and time.    Psychiatric:         Mood and Affect: Mood normal.         Behavior: Behavior normal.          MDM  Number of Diagnoses or Management Options  Gastroenteritis, acute  Gastroesophageal reflux disease, unspecified whether esophagitis present  Diagnosis management comments: 47 y.o. male with history of GERD, monoclonal gammopathy, obesity and onychomycosis presents to ED with 1 day of nausea, vomiting, and abdominal pain. Vital signs stable in triage. Physical exam notable for periumbilical abdominal tenderness to palpation without guarding or rebound. Urine notable for small amount of blood but otherwise unremarkable. Labs unremarkable. CT of abdomen pelvis showed no acute abnormalities. Patient received 2 mg of morphine and Zofran with relief of symptoms but later reported return of pain. Received GI cocktail with again relief of symptoms. Also received IV fluids. Suspect acute gastroenteritis. Patient will be discharged with prescription for Zofran and famotidine as well as instructions for conservative care, follow-up and return precautions.        Amount and/or Complexity of Data Reviewed  Clinical lab tests: reviewed and ordered  Tests in the radiology section of CPT®: ordered and reviewed  Discuss the patient with other providers: yes           Procedures

## 2022-05-22 NOTE — DISCHARGE INSTRUCTIONS
Take new medication as needed for symptoms. Continue to stay hydrated and eat bland, easy to digest foods such as bananas, rice, applesauce and toast. Follow up with your PCP and return with any changes or worsening of symptoms.

## 2022-05-23 DIAGNOSIS — K21.9 GASTROESOPHAGEAL REFLUX DISEASE, UNSPECIFIED WHETHER ESOPHAGITIS PRESENT: Primary | ICD-10-CM

## 2022-06-27 ENCOUNTER — HOSPITAL ENCOUNTER (OUTPATIENT)
Dept: MRI IMAGING | Age: 52
Discharge: HOME | End: 2022-06-27
Attending: INTERNAL MEDICINE
Payer: MEDICAID

## 2022-06-27 DIAGNOSIS — G44.52 NEW DAILY PERSISTENT HEADACHE: ICD-10-CM

## 2022-06-27 PROCEDURE — 74011250636 HC RX REV CODE- 250/636

## 2022-06-27 PROCEDURE — 70553 MRI BRAIN STEM W/O & W/DYE: CPT

## 2022-06-27 PROCEDURE — A9576 INJ PROHANCE MULTIPACK: HCPCS

## 2022-06-27 RX ADMIN — GADOTERIDOL 15 ML: 279.3 INJECTION, SOLUTION INTRAVENOUS at 19:39

## 2022-08-04 ENCOUNTER — OFFICE VISIT (OUTPATIENT)
Dept: INTERNAL MEDICINE CLINIC | Age: 52
End: 2022-08-04
Payer: MEDICAID

## 2022-08-04 VITALS
DIASTOLIC BLOOD PRESSURE: 68 MMHG | HEART RATE: 71 BPM | OXYGEN SATURATION: 98 % | WEIGHT: 173.2 LBS | RESPIRATION RATE: 20 BRPM | BODY MASS INDEX: 27.18 KG/M2 | TEMPERATURE: 98.9 F | HEIGHT: 67 IN | SYSTOLIC BLOOD PRESSURE: 108 MMHG

## 2022-08-04 DIAGNOSIS — M25.561 CHRONIC PAIN OF RIGHT KNEE: ICD-10-CM

## 2022-08-04 DIAGNOSIS — G89.29 CHRONIC PAIN OF RIGHT KNEE: ICD-10-CM

## 2022-08-04 DIAGNOSIS — D47.2 MGUS (MONOCLONAL GAMMOPATHY OF UNKNOWN SIGNIFICANCE): ICD-10-CM

## 2022-08-04 DIAGNOSIS — K21.9 GASTROESOPHAGEAL REFLUX DISEASE WITHOUT ESOPHAGITIS: Primary | ICD-10-CM

## 2022-08-04 DIAGNOSIS — R07.89 OTHER CHEST PAIN: ICD-10-CM

## 2022-08-04 PROCEDURE — 99213 OFFICE O/P EST LOW 20 MIN: CPT | Performed by: INTERNAL MEDICINE

## 2022-08-04 RX ORDER — FAMOTIDINE 20 MG/1
20 TABLET, FILM COATED ORAL
Qty: 30 TABLET | Refills: 11 | Status: SHIPPED | OUTPATIENT
Start: 2022-08-04

## 2022-08-04 NOTE — PROGRESS NOTES
[facility]  Jennifer James MD, [address] [address]  Phone:  [phone]  Fax: [phone]       Chief Complaint   Patient presents with    Follow-up   . SUBJECTIVE:    Naya Fontanez is a 47 y.o. male  Dictation on: 08/04/2022  4:28 PM by: Adolph Powell [[id number]]          Current Outpatient Medications   Medication Sig Dispense Refill    famotidine (PEPCID) 20 mg tablet Take 1 Tablet by mouth nightly.  30 Tablet 11     Past Medical History:   Diagnosis Date    Encounter to establish care     GERD (gastroesophageal reflux disease)     Knee pain, right     Monoclonal gammopathy 04/08/2021    Obesity (BMI 30.0-34.9)     Onychomycosis     S/P colonoscopy 03/01/2021    Patience Jenkins MD - repeat 10 yrs     Past Surgical History:   Procedure Laterality Date    COLONOSCOPY N/A 3/1/2021    COLONOSCOPY   :- performed by Patience Jenkins MD at [facility] ENDOSCOPY    [address]      surgery for torn ligament in finger     No Known Allergies      REVIEW OF SYSTEMS:  General: negative for - chills or fever  ENT: negative for - headaches, nasal congestion or tinnitus  Respiratory: negative for - cough, hemoptysis, shortness of breath or wheezing  Cardiovascular : negative for - chest pain, edema, palpitations or shortness of breath  Gastrointestinal: negative for - abdominal pain, blood in stools, heartburn or nausea/vomiting  Genito-Urinary: no dysuria, trouble voiding, or hematuria  Musculoskeletal: negative for - gait disturbance, joint pain, joint stiffness or joint swelling  Neurological: no TIA or stroke symptoms  Hematologic: no bruises, no bleeding, no swollen glands  Integument: no lumps, mole changes, nail changes or rash  Endocrine: no malaise/lethargy or unexpected weight changes      Social History     Socioeconomic History    Marital status: SINGLE   Tobacco Use    Smoking status: Never    Smokeless tobacco: Never    Tobacco comments:     [id number] smoker but rare   Vaping Use    Vaping Use: Never used   Substance and Sexual Activity    Alcohol use: Yes     Comment: social    Drug use: Never    Sexual activity: Yes     Partners: Female     Birth control/protection: Condom   Social History Narrative    Habits:  He is a lifetime non drug abuser, non cigarette abuser, occasional alcohol use. Social History:  The patient is single, lives with his girlfriend. He has a 27year old son, no grandchildren. He completed tenth grade, plus GED. He is gainfully employed as a [employer] , local primarily. No Faith preference. Family History:  Father , perhaps related to [employer], he is not for sure. Mother 71 with diabetes and hypertension. One brother with a history of hypertension     Family History   Problem Relation Age of Onset    Hypertension Mother     Diabetes Mother        OBJECTIVE:    Visit Vitals  /68 (BP 1 Location: Left upper arm, BP Patient Position: Sitting)   Pulse 71   Temp 98.9 °F (37.2 °C) (Oral)   Resp 20   Ht 5' 7\" (1.702 m)   Wt 173 lb 3.2 oz (78.6 kg)   SpO2 98%   BMI 27.13 kg/m²     CONSTITUTIONAL: well , well nourished, appears age appropriate  EYES: perrla, eom intact  ENMT:moist mucous membranes, pharynx clear  NECK: supple. Thyroid normal  RESPIRATORY: Chest: clear bilaterally   CARDIOVASCULAR: Heart: regular rate and rhythm  GASTROINTESTINAL: Abdomen: soft, bowel sounds active  HEMATOLOGIC: no pathological lymph nodes palpated  MUSCULOSKELETAL: Extremities: no edema, pulse 1+   INTEGUMENT: No unusual rashes or suspicious skin lesions noted. Nails appear normal.  NEUROLOGIC: non-focal exam   MENTAL STATUS: alert and oriented, appropriate affect           ASSESSMENT:  1. Gastroesophageal reflux disease without esophagitis    2. MGUS (monoclonal gammopathy of unknown significance)    3. Chronic pain of right knee    4. Other chest pain      Uses Pepcid on a prn basis for his GERD.       MGUS is followed by hematology. Chronic pain in his right knee is not symptomatic. Obesity is resolved. He was to have a stress test, but it was never done. We offer to reschedule his stress test and he declines. He will be back to see me in a year, sooner if he has any problems. I have discussed the diagnosis with the patient and the intended plan as seen in the  Orders. The patient understands and agees with the plan. The patient has   received an after visit summary and questions were answered concerning  future plans  Patient labs and/or xrays were reviewed  Past records were reviewed. PLAN:  .  Orders Placed This Encounter    famotidine (PEPCID) 20 mg tablet       Follow-up and Dispositions    Return in about 1 year (around 8/4/2023). ATTENTION:   This medical record was transcribed using an electronic medical records system. Although proofread, it may and can contain electronic and spelling errors. Other human spelling and other errors may be present. Corrections may be executed at a later time. Please feel free to contact us for any clarifications as needed.

## 2022-08-04 NOTE — PROGRESS NOTES
Chief Complaint   Patient presents with    Follow-up     1. Have you been to the ER, urgent care clinic since your last visit? Hospitalized since your last visit? No    2. Have you seen or consulted any other health care providers outside of the [address] [place] [address] since your last visit? Include any pap smears or colon screening.  No

## 2022-08-09 NOTE — PROGRESS NOTES
Patient returns today after a visit with PRIMO Reeder on 05/22 with a one day history of nausea, vomiting and abdominal pain. He was found to have acute gastroenteritis. Labs were unremarkable. CT of the abdomen and pelvis showed no acute abnormalities and he received 2 mg of morphine and Zofran, which relieved the symptoms. He received a GI cocktail, again with relief of symptoms, and also IV fluids and was subsequently discharged with Zofran and Pepcid. He has had an MRI of his brain on 06/27/22 that revealed no acute abnormalities. Comes in with a history of MGUS, knee pain, GERD, obesity and is seen for evaluation.

## 2023-05-18 RX ORDER — FAMOTIDINE 20 MG/1
1 TABLET, FILM COATED ORAL NIGHTLY
COMMUNITY
Start: 2022-08-04

## 2023-06-14 PROBLEM — D17.23 LIPOMA OF RIGHT THIGH: Status: ACTIVE | Noted: 2023-06-14

## 2023-06-14 PROBLEM — D17.20 LIPOMA OF ARM: Status: ACTIVE | Noted: 2023-06-14

## 2023-06-14 PROBLEM — D17.1 LIPOMA OF BACK: Status: ACTIVE | Noted: 2023-06-14

## 2023-06-20 ENCOUNTER — NURSE ONLY (OUTPATIENT)
Facility: CLINIC | Age: 53
End: 2023-06-20

## 2023-06-27 DIAGNOSIS — D47.2 IGG MONOCLONAL GAMMOPATHY OF UNCERTAIN SIGNIFICANCE: Primary | ICD-10-CM

## 2023-09-17 PROBLEM — D72.9 NEUTROPHILIA: Status: ACTIVE | Noted: 2023-09-17

## 2023-09-17 PROBLEM — D72.828 NEUTROPHILIA: Status: ACTIVE | Noted: 2023-09-17

## 2023-09-17 NOTE — PROGRESS NOTES
Becky at [address] [facility], [address] [facility]  W: [phone]  F: [phone]    Reason for Visit:   Elizabet Goins is a 46 y.o. male with GERD who is seen in consultation at the request of Dr. John Ortiz for evaluation of MGUS. History of Present Illness:   Elizabet Goins is a pleasant 46 y.o. male who presents today for evaluation of MGUS    Patient is a somewhat poor historian.    23: M-spike 0.3, K:L ratio 0.66, IgG monoclonal protein by immunofixation, creatinine 1.13, calcium 8,8, no CBC  2021: no M-spike, immunofixation IgG lambda, K:L 0.68    He thinks that his high protein is from energy drinks. He states that his energy has been good. He has lost 20-25 pounds, but is unable to provide a specific period of time. He both says that it happened \"over time\" from acid and then again says that it was \"rapid. \"  He ultimately says it occurred over ~1 month, but he is now gaining it back. Per patient he was seen in [facility] for nausea/vomiting. States that he ate something bad and was discharged same day. He only takes Pepcid as needed. He is on no other medication. He has never seen a GI doctor but says he has been referred by Dr. Sona Gallardo to one. Denies fevers, chills, recent infections, loss of appetite, bone aches, changes in urination. Endorses night sweats. Family History:  Maternal aunt -  from lung cancer (heavy smoker)  Maternal aunt -  from lung cancer  Dad - , unknown causes, ?cancer related to [address] ([employer])  1 brother - high blood pressure    Social History:  Lives with his wife. Unemployed. Never smoker. No EtOH use. Smokes marijuana, daily. Review of systems was obtained and pertinent findings reviewed above. Past medical history, social history, family history, medications, and allergies are located in the electronic medical record.     Physical Exam:   /69

## 2023-09-18 ENCOUNTER — OFFICE VISIT (OUTPATIENT)
Age: 53
End: 2023-09-18
Payer: MEDICAID

## 2023-09-18 VITALS
OXYGEN SATURATION: 96 % | DIASTOLIC BLOOD PRESSURE: 69 MMHG | BODY MASS INDEX: 25.59 KG/M2 | RESPIRATION RATE: 18 BRPM | HEART RATE: 71 BPM | WEIGHT: 163.4 LBS | SYSTOLIC BLOOD PRESSURE: 108 MMHG

## 2023-09-18 DIAGNOSIS — D72.9 NEUTROPHILIA: ICD-10-CM

## 2023-09-18 DIAGNOSIS — D47.2 MGUS (MONOCLONAL GAMMOPATHY OF UNKNOWN SIGNIFICANCE): ICD-10-CM

## 2023-09-18 DIAGNOSIS — D47.2 MGUS (MONOCLONAL GAMMOPATHY OF UNKNOWN SIGNIFICANCE): Primary | ICD-10-CM

## 2023-09-18 PROCEDURE — 99204 OFFICE O/P NEW MOD 45 MIN: CPT | Performed by: INTERNAL MEDICINE

## 2023-09-18 NOTE — PROGRESS NOTES
Georges Romo is a 46 y.o. male    Chief Complaint   Patient presents with    New Patient       1. Have you been to the ER, urgent care clinic since your last visit? Hospitalized since your last visit? No    2. Have you seen or consulted any other health care providers outside of the [address] [place] [address] since your last visit? Include any pap smears or colon screening.   Yes , PCP

## 2023-09-19 LAB
ALBUMIN SERPL-MCNC: 4.5 G/DL (ref 3.8–4.9)
ALBUMIN/GLOB SERPL: 2 {RATIO} (ref 1.2–2.2)
ALP SERPL-CCNC: 68 IU/L (ref 44–121)
ALT SERPL-CCNC: 22 IU/L (ref 0–44)
AST SERPL-CCNC: 20 IU/L (ref 0–40)
B2 MICROGLOB SERPL-MCNC: 1.3 MG/L (ref 0.6–2.4)
BILIRUB SERPL-MCNC: 0.3 MG/DL (ref 0–1.2)
BUN SERPL-MCNC: 16 MG/DL (ref 6–24)
BUN/CREAT SERPL: 14 (ref 9–20)
CALCIUM SERPL-MCNC: 9.2 MG/DL (ref 8.7–10.2)
CHLORIDE SERPL-SCNC: 103 MMOL/L (ref 96–106)
CO2 SERPL-SCNC: 26 MMOL/L (ref 20–29)
CREAT SERPL-MCNC: 1.12 MG/DL (ref 0.76–1.27)
EGFRCR SERPLBLD CKD-EPI 2021: 78 ML/MIN/1.73
ERYTHROCYTE [SEDIMENTATION RATE] IN BLOOD BY WESTERGREN METHOD: 2 MM/HR (ref 0–30)
GLOBULIN SER CALC-MCNC: 2.3 G/DL (ref 1.5–4.5)
GLUCOSE SERPL-MCNC: 63 MG/DL (ref 70–99)
LDH SERPL L TO P-CCNC: 235 IU/L (ref 121–224)
POTASSIUM SERPL-SCNC: 3.9 MMOL/L (ref 3.5–5.2)
SODIUM SERPL-SCNC: 142 MMOL/L (ref 134–144)

## 2023-09-20 LAB
BASOPHILS # BLD AUTO: 0 X10E3/UL (ref 0–0.2)
BASOPHILS NFR BLD AUTO: 0 %
EOSINOPHIL # BLD AUTO: 0.1 X10E3/UL (ref 0–0.4)
EOSINOPHIL NFR BLD AUTO: 1 %
ERYTHROCYTE [DISTWIDTH] IN BLOOD BY AUTOMATED COUNT: 13.4 % (ref 11.6–15.4)
HCT VFR BLD AUTO: 44.7 % (ref 37.5–51)
HGB BLD-MCNC: 15.1 G/DL (ref 13–17.7)
IMM GRANULOCYTES # BLD AUTO: 0 X10E3/UL (ref 0–0.1)
IMM GRANULOCYTES NFR BLD AUTO: 0 %
LYMPHOCYTES # BLD AUTO: 1.2 X10E3/UL (ref 0.7–3.1)
LYMPHOCYTES NFR BLD AUTO: 15 %
MCH RBC QN AUTO: 30.4 PG (ref 26.6–33)
MCHC RBC AUTO-ENTMCNC: 33.8 G/DL (ref 31.5–35.7)
MCV RBC AUTO: 90 FL (ref 79–97)
MONOCYTES # BLD AUTO: 0.6 X10E3/UL (ref 0.1–0.9)
MONOCYTES NFR BLD AUTO: 7 %
NEUTROPHILS # BLD AUTO: 6.2 X10E3/UL (ref 1.4–7)
NEUTROPHILS NFR BLD AUTO: 77 %
PATH INTERP BLD-IMP: NORMAL
PATH REV BLD -IMP: NORMAL
PATHOLOGIST NAME: NORMAL
PLATELET # BLD AUTO: 197 X10E3/UL (ref 150–450)
RBC # BLD AUTO: 4.96 X10E6/UL (ref 4.14–5.8)
WBC # BLD AUTO: 8.1 X10E3/UL (ref 3.4–10.8)

## 2023-09-25 LAB
ALBUMIN SERPL ELPH-MCNC: 4 G/DL (ref 2.9–4.4)
ALBUMIN/GLOB SERPL: 1.5 {RATIO} (ref 0.7–1.7)
ALPHA1 GLOB SERPL ELPH-MCNC: 0.2 G/DL (ref 0–0.4)
ALPHA2 GLOB SERPL ELPH-MCNC: 0.5 G/DL (ref 0.4–1)
B-GLOBULIN SERPL ELPH-MCNC: 0.9 G/DL (ref 0.7–1.3)
GAMMA GLOB SERPL ELPH-MCNC: 1.1 G/DL (ref 0.4–1.8)
GLOBULIN SER-MCNC: 2.8 G/DL (ref 2.2–3.9)
IGA SERPL-MCNC: 190 MG/DL (ref 90–386)
IGG SERPL-MCNC: 1110 MG/DL (ref 603–1613)
IGM SERPL-MCNC: 77 MG/DL (ref 20–172)
INTERPRETATION SERPL IEP-IMP: ABNORMAL
KAPPA LC FREE SER-MCNC: 24.7 MG/L (ref 3.3–19.4)
KAPPA LC FREE/LAMBDA FREE SER: 0.68 {RATIO} (ref 0.26–1.65)
LABORATORY COMMENT REPORT: ABNORMAL
LAMBDA LC FREE SERPL-MCNC: 36.3 MG/L (ref 5.7–26.3)
M PROTEIN SERPL ELPH-MCNC: 0.3 G/DL
PROT SERPL-MCNC: 6.8 G/DL (ref 6–8.5)

## 2023-10-09 ENCOUNTER — TELEPHONE (OUTPATIENT)
Age: 53
End: 2023-10-09

## 2023-10-09 NOTE — TELEPHONE ENCOUNTER
[id number]     Patient hippa verified x2     Spoke with patient just reminding him to please get labs done prior to appointment. And patient stated he will do so I faxed labs to per. [facility] ( hospitals )     Patient had no question or concerns at this time.

## 2023-10-15 PROBLEM — D72.828 NEUTROPHILIA: Status: RESOLVED | Noted: 2023-09-17 | Resolved: 2023-10-15

## 2023-10-15 PROBLEM — D72.9 NEUTROPHILIA: Status: RESOLVED | Noted: 2023-09-17 | Resolved: 2023-10-15

## 2023-10-19 ENCOUNTER — TELEMEDICINE (OUTPATIENT)
Age: 53
End: 2023-10-19
Payer: MEDICAID

## 2023-10-19 DIAGNOSIS — D47.2 MGUS (MONOCLONAL GAMMOPATHY OF UNKNOWN SIGNIFICANCE): Primary | ICD-10-CM

## 2023-10-19 PROCEDURE — 99213 OFFICE O/P EST LOW 20 MIN: CPT | Performed by: INTERNAL MEDICINE

## 2023-11-01 ENCOUNTER — TELEPHONE (OUTPATIENT)
Age: 53
End: 2023-11-01

## 2023-11-01 NOTE — TELEPHONE ENCOUNTER
Final Anesthesia Post-op Assessment    Patient: Tigist Doss  Procedure(s) Performed: COLONOSCOPY  Anesthesia type: MAC    Vitals Value Taken Time   Temp 36.2 °C (97.1 °F) 12/16/20 1123   Pulse 72 12/16/20 1123   Resp 20 12/16/20 1123   SpO2 97 % 12/11/20 1355   /78 12/16/20 1123         Patient Location: PACU Phase 2  Post-op Vital Signs:stable  Level of Consciousness: participates in exam, awake, oriented, answers questions appropriately and alert  Respiratory Status: spontaneous ventilation  Cardiovascular blood pressure returned to baseline and stable  Hydration: euvolemic  Pain Management: well controlled  Handoff: Handoff to receiving clinician was performed and questions were answered Nausea: None  Airway Patency:patent  Post-op Assessment: awake, alert, appropriately conversant, or baseline, no complications, patient tolerated procedure well with no complications and evidence of recall  Comments: Did well, VSS, ready for discharge shortly.      No complications documented.    Verified patient ID x 2    Spoke with patient about needed bone scan and urine test. Patient requested we mail him the info.

## 2024-03-19 DIAGNOSIS — R20.2 NUMBNESS AND TINGLING IN LEFT ARM: Primary | ICD-10-CM

## 2024-03-19 DIAGNOSIS — R20.0 NUMBNESS AND TINGLING IN LEFT ARM: Primary | ICD-10-CM

## 2024-04-18 ENCOUNTER — TELEPHONE (OUTPATIENT)
Age: 54
End: 2024-04-18

## 2024-04-18 DIAGNOSIS — D47.2 MGUS (MONOCLONAL GAMMOPATHY OF UNKNOWN SIGNIFICANCE): ICD-10-CM

## 2024-04-18 NOTE — TELEPHONE ENCOUNTER
SW referral received. Outreached patient to follow up on insurance-related barrier.   Verified patient ID x 2     Patient reports lapse in Medicaid coverage. He received letter and knows to call Cover [employer] and provide necessary documents to obtain coverage again. He plans to do this at lunch today. Advised him to reapply with retroactive coverage (this covers up to 90 days before date of re-application).    Pt indicated that nothing has changed financially since he last qualified for Medicaid. Explained that he would therefore qualify for our [facility] [employer] Financial Assistance Program, which could help to cover cost of medically necessary care not covered by insurance. Advised I can help him apply for this as needed.     Patient aware he can schedule and attend visits and ask to be billed for those services. Prefers to get labs at [facility] facility.    He requested copy of lab orders be mailed to him. Confirmed address on file to be correct.     He requested call from  after 3:30pm today to reschedule OV.    SW remains available for further assistance.      Signed by: Vero Hodge LMSW

## 2024-04-22 ENCOUNTER — TELEPHONE (OUTPATIENT)
Age: 54
End: 2024-04-22

## 2024-04-22 NOTE — TELEPHONE ENCOUNTER
SW returned missed call from patient. He left voicemail indicating he had to cancel today's lab visit. Made contact, HIPAA verified by two patient identifiers.      Patient says that he was at hospital from the middle of the night Saturday to Sunday morning with acid reflux issues. Says he is not feeling well and that he \"has no veins left right now.\" Asked if I could have Dr. Rod's nurse contact him regarding next steps, as he understands upcoming OV may need to be rescheduled depending on when he can complete labs. He is agreeable and appreciates the assistance.     SW remains available for further assistance.      Signed by: Vero Hodge LMSW

## 2024-04-24 ENCOUNTER — HOSPITAL ENCOUNTER (EMERGENCY)
Facility: HOSPITAL | Age: 54
Discharge: HOME | End: 2024-04-24
Attending: STUDENT IN AN ORGANIZED HEALTH CARE EDUCATION/TRAINING PROGRAM
Payer: MEDICAID

## 2024-04-24 ENCOUNTER — TELEPHONE (OUTPATIENT)
Age: 54
End: 2024-04-24

## 2024-04-24 ENCOUNTER — ANCILLARY PROCEDURE (OUTPATIENT)
Facility: HOSPITAL | Age: 54
End: 2024-04-24
Attending: STUDENT IN AN ORGANIZED HEALTH CARE EDUCATION/TRAINING PROGRAM
Payer: MEDICAID

## 2024-04-24 VITALS
SYSTOLIC BLOOD PRESSURE: 101 MMHG | WEIGHT: 159.61 LBS | RESPIRATION RATE: 18 BRPM | HEIGHT: 67 IN | OXYGEN SATURATION: 95 % | DIASTOLIC BLOOD PRESSURE: 69 MMHG | HEART RATE: 68 BPM | BODY MASS INDEX: 25.05 KG/M2 | TEMPERATURE: 98.5 F

## 2024-04-24 DIAGNOSIS — R10.13 ABDOMINAL PAIN, EPIGASTRIC: Primary | ICD-10-CM

## 2024-04-24 LAB
ALBUMIN SERPL-MCNC: 4.2 G/DL (ref 3.5–5)
ALBUMIN/GLOB SERPL: 1.1 (ref 1.1–2.2)
ALP SERPL-CCNC: 67 U/L (ref 45–117)
ALT SERPL-CCNC: 52 U/L (ref 12–78)
ANION GAP SERPL CALC-SCNC: 4 MMOL/L (ref 5–15)
AST SERPL-CCNC: 18 U/L (ref 15–37)
BASOPHILS # BLD: 0 K/UL (ref 0–0.1)
BASOPHILS NFR BLD: 0 % (ref 0–1)
BILIRUB SERPL-MCNC: 0.8 MG/DL (ref 0.2–1)
BUN SERPL-MCNC: 23 MG/DL (ref 6–20)
BUN/CREAT SERPL: 17 (ref 12–20)
CALCIUM SERPL-MCNC: 9.3 MG/DL (ref 8.5–10.1)
CHLORIDE SERPL-SCNC: 101 MMOL/L (ref 97–108)
CO2 SERPL-SCNC: 32 MMOL/L (ref 21–32)
COMMENT:: NORMAL
CREAT SERPL-MCNC: 1.36 MG/DL (ref 0.7–1.3)
DIFFERENTIAL METHOD BLD: ABNORMAL
EOSINOPHIL # BLD: 0 K/UL (ref 0–0.4)
EOSINOPHIL NFR BLD: 0 % (ref 0–7)
ERYTHROCYTE [DISTWIDTH] IN BLOOD BY AUTOMATED COUNT: 12.2 % (ref 11.5–14.5)
GLOBULIN SER CALC-MCNC: 3.7 G/DL (ref 2–4)
GLUCOSE SERPL-MCNC: 89 MG/DL (ref 65–100)
HCT VFR BLD AUTO: 44.9 % (ref 36.6–50.3)
HGB BLD-MCNC: 16.4 G/DL (ref 12.1–17)
IMM GRANULOCYTES # BLD AUTO: 0 K/UL (ref 0–0.04)
IMM GRANULOCYTES NFR BLD AUTO: 0 % (ref 0–0.5)
LIPASE SERPL-CCNC: 19 U/L (ref 13–75)
LYMPHOCYTES # BLD: 0.9 K/UL (ref 0.8–3.5)
LYMPHOCYTES NFR BLD: 10 % (ref 12–49)
MAGNESIUM SERPL-MCNC: 2.4 MG/DL (ref 1.6–2.4)
MCH RBC QN AUTO: 31.1 PG (ref 26–34)
MCHC RBC AUTO-ENTMCNC: 36.5 G/DL (ref 30–36.5)
MCV RBC AUTO: 85 FL (ref 80–99)
MONOCYTES # BLD: 0.6 K/UL (ref 0–1)
MONOCYTES NFR BLD: 6 % (ref 5–13)
NEUTS SEG # BLD: 7.5 K/UL (ref 1.8–8)
NEUTS SEG NFR BLD: 84 % (ref 32–75)
NRBC # BLD: 0 K/UL (ref 0–0.01)
NRBC BLD-RTO: 0 PER 100 WBC
PLATELET # BLD AUTO: 170 K/UL (ref 150–400)
PMV BLD AUTO: 9.9 FL (ref 8.9–12.9)
POTASSIUM SERPL-SCNC: 3.2 MMOL/L (ref 3.5–5.1)
PROT SERPL-MCNC: 7.9 G/DL (ref 6.4–8.2)
RBC # BLD AUTO: 5.28 M/UL (ref 4.1–5.7)
SODIUM SERPL-SCNC: 137 MMOL/L (ref 136–145)
SPECIMEN HOLD: NORMAL
WBC # BLD AUTO: 9 K/UL (ref 4.1–11.1)

## 2024-04-24 PROCEDURE — 83690 ASSAY OF LIPASE: CPT

## 2024-04-24 PROCEDURE — 99284 EMERGENCY DEPT VISIT MOD MDM: CPT

## 2024-04-24 PROCEDURE — 2580000003 HC RX 258: Performed by: STUDENT IN AN ORGANIZED HEALTH CARE EDUCATION/TRAINING PROGRAM

## 2024-04-24 PROCEDURE — 85025 COMPLETE CBC W/AUTO DIFF WBC: CPT

## 2024-04-24 PROCEDURE — 6360000002 HC RX W HCPCS: Performed by: STUDENT IN AN ORGANIZED HEALTH CARE EDUCATION/TRAINING PROGRAM

## 2024-04-24 PROCEDURE — 96375 TX/PRO/DX INJ NEW DRUG ADDON: CPT

## 2024-04-24 PROCEDURE — A4216 STERILE WATER/SALINE, 10 ML: HCPCS | Performed by: STUDENT IN AN ORGANIZED HEALTH CARE EDUCATION/TRAINING PROGRAM

## 2024-04-24 PROCEDURE — C9113 INJ PANTOPRAZOLE SODIUM, VIA: HCPCS | Performed by: STUDENT IN AN ORGANIZED HEALTH CARE EDUCATION/TRAINING PROGRAM

## 2024-04-24 PROCEDURE — 80053 COMPREHEN METABOLIC PANEL: CPT

## 2024-04-24 PROCEDURE — 83735 ASSAY OF MAGNESIUM: CPT

## 2024-04-24 PROCEDURE — 96374 THER/PROPH/DIAG INJ IV PUSH: CPT

## 2024-04-24 PROCEDURE — 36415 COLL VENOUS BLD VENIPUNCTURE: CPT

## 2024-04-24 RX ORDER — 0.9 % SODIUM CHLORIDE 0.9 %
1000 INTRAVENOUS SOLUTION INTRAVENOUS ONCE
Status: COMPLETED | OUTPATIENT
Start: 2024-04-24 | End: 2024-04-24

## 2024-04-24 RX ORDER — ONDANSETRON 2 MG/ML
4 INJECTION INTRAMUSCULAR; INTRAVENOUS ONCE
Status: COMPLETED | OUTPATIENT
Start: 2024-04-24 | End: 2024-04-24

## 2024-04-24 RX ORDER — ONDANSETRON 4 MG/1
4 TABLET, ORALLY DISINTEGRATING ORAL 3 TIMES DAILY PRN
Qty: 21 TABLET | Refills: 0 | Status: SHIPPED | OUTPATIENT
Start: 2024-04-24

## 2024-04-24 RX ORDER — PANTOPRAZOLE SODIUM 20 MG/1
20 TABLET, DELAYED RELEASE ORAL DAILY
Qty: 30 TABLET | Refills: 0 | Status: SHIPPED | OUTPATIENT
Start: 2024-04-24

## 2024-04-24 RX ADMIN — ONDANSETRON 4 MG: 2 INJECTION INTRAMUSCULAR; INTRAVENOUS at 11:26

## 2024-04-24 RX ADMIN — SODIUM CHLORIDE 1000 ML: 9 INJECTION, SOLUTION INTRAVENOUS at 11:25

## 2024-04-24 RX ADMIN — PANTOPRAZOLE SODIUM 40 MG: 40 INJECTION, POWDER, FOR SOLUTION INTRAVENOUS at 11:27

## 2024-04-24 ASSESSMENT — PAIN DESCRIPTION - LOCATION: LOCATION: ABDOMEN

## 2024-04-24 ASSESSMENT — PAIN SCALES - GENERAL: PAINLEVEL_OUTOF10: 8

## 2024-04-24 ASSESSMENT — PAIN DESCRIPTION - PAIN TYPE: TYPE: ACUTE PAIN

## 2024-04-24 ASSESSMENT — PAIN - FUNCTIONAL ASSESSMENT
PAIN_FUNCTIONAL_ASSESSMENT: ACTIVITIES ARE NOT PREVENTED
PAIN_FUNCTIONAL_ASSESSMENT: 0-10

## 2024-04-24 ASSESSMENT — PAIN DESCRIPTION - DESCRIPTORS: DESCRIPTORS: ACHING

## 2024-04-24 ASSESSMENT — PAIN DESCRIPTION - ORIENTATION: ORIENTATION: MID

## 2024-04-24 NOTE — ED TRIAGE NOTES
Pt arrives ambulatory to triage c/o abd pain that started Saturday. Pt endorses vomiting. Pt denies diarrhea. Pt states its in the center of his abdomen.

## 2024-04-24 NOTE — ED PROVIDER NOTES
[facility] EMERGENCY DEP  EMERGENCY DEPARTMENT ENCOUNTER      Pt Name: Mukund Woodard  MRN: [id number]  Birthdate 1970  Date of evaluation: 4/24/2024  Provider: Wali Chow MD    CHIEF COMPLAINT     No chief complaint on file.        HISTORY OF PRESENT ILLNESS   (Location/Symptom, Timing/Onset, Context/Setting, Quality, Duration, Modifying Factors, Severity)  Note limiting factors.   Patient is a 54-year-old male present emergency department for epigastric abdominal pain nausea and vomiting.  Patient says that he has a history of GERD was supposed to have follow-up with GI for upper endoscopy has not been able to follow-up as recently had epigastric abdominal pain that he currently rates as a 7/10 he denies any fevers, chills has been having nausea and vomiting unable to keep anything down.            Review of External Medical Records:     Nursing Notes were reviewed.    REVIEW OF SYSTEMS    (2-9 systems for level 4, 10 or more for level 5)     Review of Systems    Except as noted above the remainder of the review of systems was reviewed and negative.       PAST MEDICAL HISTORY     Past Medical History:   Diagnosis Date    Encounter to establish care     GERD (gastroesophageal reflux disease)     Knee pain, right     Lipoma of arm 06/14/2023    Lipoma of back 06/14/2023    Lipoma of right thigh 06/14/2023    Monoclonal gammopathy 04/08/2021    Obesity (BMI 30.0-34.9)     Onychomycosis     S/P colonoscopy 03/01/2021    Glenis Morales MD - repeat 10 yrs         SURGICAL HISTORY       Past Surgical History:   Procedure Laterality Date    COLONOSCOPY N/A 3/1/2021    COLONOSCOPY   :- performed by Glenis Morales MD at [facility] ENDOSCOPY    ORTHOPEDIC SURGERY      surgery for torn ligament in finger         CURRENT MEDICATIONS       Previous Medications    FAMOTIDINE (PEPCID) 20 MG TABLET    Take 1 tablet by mouth nightly       ALLERGIES     Patient has no known allergies.    FAMILY HISTORY       Family

## 2024-04-24 NOTE — TELEPHONE ENCOUNTER
339     Patient HIPAA verified x2     We totally understand he wasn't feeling well and couldn't get labs done. And just got discharged from the hospital but we wanted to  let him know we mailed his lab slips last week. He needs labs, to do a 24 hour urine test, and schedule his skeletal bone survey scan     Patient verbalized understanding and has no question or concerns

## 2024-04-29 ENCOUNTER — TELEPHONE (OUTPATIENT)
Age: 54
End: 2024-04-29

## 2024-05-13 ENCOUNTER — TELEPHONE (OUTPATIENT)
Age: 54
End: 2024-05-13

## 2024-05-21 ENCOUNTER — HOSPITAL ENCOUNTER (OUTPATIENT)
Facility: HOSPITAL | Age: 54
Discharge: HOME | End: 2024-05-24
Attending: INTERNAL MEDICINE

## 2024-05-21 DIAGNOSIS — D47.2 MGUS (MONOCLONAL GAMMOPATHY OF UNKNOWN SIGNIFICANCE): ICD-10-CM

## 2024-05-21 DIAGNOSIS — R20.0 NUMBNESS AND TINGLING IN LEFT ARM: ICD-10-CM

## 2024-05-21 DIAGNOSIS — R20.2 NUMBNESS AND TINGLING IN LEFT ARM: ICD-10-CM

## 2024-05-21 PROCEDURE — 77075 RADEX OSSEOUS SURVEY COMPL: CPT

## 2024-05-22 LAB
ALBUMIN SERPL-MCNC: 4.6 G/DL (ref 3.8–4.9)
ALBUMIN/GLOB SERPL: 2.1 {RATIO} (ref 1.2–2.2)
ALP SERPL-CCNC: 72 IU/L (ref 44–121)
ALT SERPL-CCNC: 28 IU/L (ref 0–44)
AST SERPL-CCNC: 29 IU/L (ref 0–40)
BASOPHILS # BLD AUTO: 0 X10E3/UL (ref 0–0.2)
BASOPHILS NFR BLD AUTO: 0 %
BILIRUB SERPL-MCNC: 0.4 MG/DL (ref 0–1.2)
BUN SERPL-MCNC: 19 MG/DL (ref 6–24)
BUN/CREAT SERPL: 21 (ref 9–20)
CALCIUM SERPL-MCNC: 8.9 MG/DL (ref 8.7–10.2)
CHLORIDE SERPL-SCNC: 105 MMOL/L (ref 96–106)
CO2 SERPL-SCNC: 24 MMOL/L (ref 20–29)
CREAT SERPL-MCNC: 0.89 MG/DL (ref 0.76–1.27)
EGFRCR SERPLBLD CKD-EPI 2021: 101 ML/MIN/1.73
EOSINOPHIL # BLD AUTO: 0.1 X10E3/UL (ref 0–0.4)
EOSINOPHIL NFR BLD AUTO: 1 %
ERYTHROCYTE [DISTWIDTH] IN BLOOD BY AUTOMATED COUNT: 13.1 % (ref 11.6–15.4)
ERYTHROCYTE [SEDIMENTATION RATE] IN BLOOD BY WESTERGREN METHOD: 2 MM/HR (ref 0–30)
GLOBULIN SER CALC-MCNC: 2.2 G/DL (ref 1.5–4.5)
GLUCOSE SERPL-MCNC: 61 MG/DL (ref 70–99)
HCT VFR BLD AUTO: 42.3 % (ref 37.5–51)
HGB BLD-MCNC: 14.1 G/DL (ref 13–17.7)
IMM GRANULOCYTES # BLD AUTO: 0 X10E3/UL (ref 0–0.1)
IMM GRANULOCYTES NFR BLD AUTO: 0 %
LDH SERPL L TO P-CCNC: 258 IU/L (ref 121–224)
LYMPHOCYTES # BLD AUTO: 1.2 X10E3/UL (ref 0.7–3.1)
LYMPHOCYTES NFR BLD AUTO: 15 %
MCH RBC QN AUTO: 30.1 PG (ref 26.6–33)
MCHC RBC AUTO-ENTMCNC: 33.3 G/DL (ref 31.5–35.7)
MCV RBC AUTO: 90 FL (ref 79–97)
MONOCYTES # BLD AUTO: 0.5 X10E3/UL (ref 0.1–0.9)
MONOCYTES NFR BLD AUTO: 6 %
NEUTROPHILS # BLD AUTO: 6.4 X10E3/UL (ref 1.4–7)
NEUTROPHILS NFR BLD AUTO: 78 %
PLATELET # BLD AUTO: 196 X10E3/UL (ref 150–450)
POTASSIUM SERPL-SCNC: 4 MMOL/L (ref 3.5–5.2)
RBC # BLD AUTO: 4.68 X10E6/UL (ref 4.14–5.8)
SODIUM SERPL-SCNC: 142 MMOL/L (ref 134–144)
WBC # BLD AUTO: 8.2 X10E3/UL (ref 3.4–10.8)

## 2024-05-28 ENCOUNTER — TELEPHONE (OUTPATIENT)
Age: 54
End: 2024-05-28

## 2024-05-28 NOTE — TELEPHONE ENCOUNTER
1122     Patient HIPAA verified x2     patient know the good news- his skeletal survey shows no suspicious bone lesions. We will further discuss at OV 6/10, along with lab results   Spoke with patient and let him know that     Patient verbalized understanding with no question or concerns

## 2024-05-29 ENCOUNTER — TELEPHONE (OUTPATIENT)
Age: 54
End: 2024-05-29

## 2024-05-29 LAB
ALBUMIN SERPL ELPH-MCNC: 4.1 G/DL (ref 2.9–4.4)
ALBUMIN/GLOB SERPL: 1.6 {RATIO} (ref 0.7–1.7)
ALPHA1 GLOB SERPL ELPH-MCNC: 0.3 G/DL (ref 0–0.4)
ALPHA2 GLOB SERPL ELPH-MCNC: 0.5 G/DL (ref 0.4–1)
B-GLOBULIN SERPL ELPH-MCNC: 1 G/DL (ref 0.7–1.3)
GAMMA GLOB SERPL ELPH-MCNC: 1 G/DL (ref 0.4–1.8)
GLOBULIN SER-MCNC: 2.7 G/DL (ref 2.2–3.9)
IGA SERPL-MCNC: 180 MG/DL (ref 90–386)
IGG SERPL-MCNC: 1075 MG/DL (ref 603–1613)
IGM SERPL-MCNC: 66 MG/DL (ref 20–172)
INTERPRETATION SERPL IEP-IMP: ABNORMAL
KAPPA LC FREE SER-MCNC: 17.7 MG/L (ref 3.3–19.4)
KAPPA LC FREE/LAMBDA FREE SER: 0.49 {RATIO} (ref 0.26–1.65)
LABORATORY COMMENT REPORT: ABNORMAL
LAMBDA LC FREE SERPL-MCNC: 36 MG/L (ref 5.7–26.3)
M PROTEIN SERPL ELPH-MCNC: 0.2 G/DL
PROT SERPL-MCNC: 6.8 G/DL (ref 6–8.5)

## 2024-05-29 NOTE — TELEPHONE ENCOUNTER
345     Patient HIPAA Verified x2     Spoke with patient stated he havent got 24 hr urine done yet he reminded we wanted him to do it closer to has appointment when with us so yall get it done then.      No question or concerns at this time

## 2024-06-04 NOTE — PROGRESS NOTES
[facility]  [address], [address] [facility] [address]  W: [phone]  F: [phone]    Reason for Visit:   Mukund Woodard is a 54 y.o. male with GERD who is seen for follow up of MGUS.    Assessment:   #) IgG Lambda MGUS:  First noted on 4/2021. Repeat labs from 6/2023 stable with M-spike 0.3 g/dL on SPEP, IgG lambda by FLAKITA, and normal K:L ratio.  No immunofixation.    Established with me 9/2023. Repeat labs with stable M-spike 0.3 g/dL, IgG lambda by FLAKITA, and normal K:L ratio (0.68). He has no evidence of end organ dysfunction (e.g. normal Cr, blood counts, and calcium). He did not complete his 24-hour UPEP.     In the majority of cases, MGUS is a benign finding and affects approximately 1-3% of the [place] population.   Americans are at a 2-3x higher risk than Caucasians.  For most of these patients it has no clinical significance, and can simply be followed over time.  However, in some cases MGUS is reflective of an underlying plasma cell dyscrasia, such as Multiple Myeloma, Waldenstrom macroglobulinemia, or Amyloidosis.      Given low-risk (e.g. IgG M-spike <1.5 g/dL and normal K:L ratio), we can hold off on bone marrow biopsy. 5/2024 skeletal survey with no suspicious lesions. Will obtain with 24-hour UPEP now. Otherwise, can proceed with monitoring for low-risk MGUS q6 months.    #) Neutrophilia, resolved:  Noted on 1/2022 blood work and persistent on recheck from 5/2022.  Now resolved.     #) Lipomas:   Reassurance provided    Plan:     Obtain 24-hour UPEP now  Monitor labs q6 months, including CBC with diff, CMP, gammopathy eval, ESR  Hold on bone marrow biopsy  Return to clinic in 1 year with labs prior to visit.    Signed By: MAME Flores - CNP   6/10/24 at 2:19 PM EDT       History of Present Illness:   Mukund Woodard is a pleasant 54 y.o. male who presents today for evaluation of MGUS    6/20/23: M-spike 0.3, K:L ratio 0.66, IgG monoclonal protein by

## 2024-06-06 ENCOUNTER — TELEPHONE (OUTPATIENT)
Age: 54
End: 2024-06-06

## 2024-06-06 NOTE — TELEPHONE ENCOUNTER
Phoned pt to remind of 6/10 appt; also notified to complete 24hr urine. Pt stated he thought it had to be completed 24 hours prior to appt; advised to complete a soonest convenience.  Pt expressed understanding.

## 2024-06-10 ENCOUNTER — OFFICE VISIT (OUTPATIENT)
Age: 54
End: 2024-06-10

## 2024-06-10 VITALS
HEART RATE: 77 BPM | OXYGEN SATURATION: 99 % | RESPIRATION RATE: 18 BRPM | DIASTOLIC BLOOD PRESSURE: 86 MMHG | BODY MASS INDEX: 26.22 KG/M2 | SYSTOLIC BLOOD PRESSURE: 126 MMHG | WEIGHT: 167.4 LBS

## 2024-06-10 DIAGNOSIS — D47.2 MGUS (MONOCLONAL GAMMOPATHY OF UNKNOWN SIGNIFICANCE): Primary | ICD-10-CM

## 2024-06-10 PROCEDURE — 99213 OFFICE O/P EST LOW 20 MIN: CPT

## 2024-06-10 NOTE — PROGRESS NOTES
Mukund Woodard is a 54 y.o. male     Chief Complaint   Patient presents with    Follow-up     MGUS.       1. Have you been to the ER, urgent care clinic since your last visit?  Hospitalized since your last visit?No    2. Have you seen or consulted any other health care providers outside of the [facility] since your last visit?  Include any pap smears or colon screening. No

## 2024-12-10 DIAGNOSIS — D47.2 MGUS (MONOCLONAL GAMMOPATHY OF UNKNOWN SIGNIFICANCE): ICD-10-CM

## 2025-06-04 ENCOUNTER — TELEPHONE (OUTPATIENT)
Age: 55
End: 2025-06-04

## 2025-06-04 NOTE — TELEPHONE ENCOUNTER
Called Pt. LVM reminder of upcoming ov and need to complete labs and urine test prior. Advised Pt to respond to MCM or give us a call if any issues getting labs done/need to reschedule.

## 2025-06-06 ENCOUNTER — TELEPHONE (OUTPATIENT)
Age: 55
End: 2025-06-06

## 2025-06-06 NOTE — TELEPHONE ENCOUNTER
Pt called and requested to cancel his upcoming appt. Pt stated that he doesn't have insurance. Karlos

## 2025-06-10 DIAGNOSIS — D47.2 MGUS (MONOCLONAL GAMMOPATHY OF UNKNOWN SIGNIFICANCE): ICD-10-CM

## 2025-07-08 ENCOUNTER — TELEPHONE (OUTPATIENT)
Age: 55
End: 2025-07-08